# Patient Record
Sex: MALE | Race: WHITE | Employment: PART TIME | URBAN - METROPOLITAN AREA
[De-identification: names, ages, dates, MRNs, and addresses within clinical notes are randomized per-mention and may not be internally consistent; named-entity substitution may affect disease eponyms.]

---

## 2017-02-06 DIAGNOSIS — R97.20 ELEVATED PSA: ICD-10-CM

## 2017-02-13 ENCOUNTER — OFFICE VISIT (OUTPATIENT)
Dept: UROLOGY | Age: 57
End: 2017-02-13

## 2017-02-13 VITALS
WEIGHT: 190 LBS | HEIGHT: 68 IN | HEART RATE: 87 BPM | DIASTOLIC BLOOD PRESSURE: 84 MMHG | SYSTOLIC BLOOD PRESSURE: 126 MMHG | BODY MASS INDEX: 28.79 KG/M2

## 2017-02-13 DIAGNOSIS — Z87.898 HISTORY OF ELEVATED PSA: ICD-10-CM

## 2017-02-13 DIAGNOSIS — N40.0 BENIGN NON-NODULAR PROSTATIC HYPERPLASIA WITHOUT LOWER URINARY TRACT SYMPTOMS: Primary | ICD-10-CM

## 2017-02-13 PROCEDURE — 99213 OFFICE O/P EST LOW 20 MIN: CPT | Performed by: UROLOGY

## 2017-02-13 PROCEDURE — G8419 CALC BMI OUT NRM PARAM NOF/U: HCPCS | Performed by: UROLOGY

## 2017-02-13 PROCEDURE — 1036F TOBACCO NON-USER: CPT | Performed by: UROLOGY

## 2017-02-13 PROCEDURE — 3017F COLORECTAL CA SCREEN DOC REV: CPT | Performed by: UROLOGY

## 2017-02-13 PROCEDURE — G8427 DOCREV CUR MEDS BY ELIG CLIN: HCPCS | Performed by: UROLOGY

## 2017-02-13 PROCEDURE — G8484 FLU IMMUNIZE NO ADMIN: HCPCS | Performed by: UROLOGY

## 2017-02-13 ASSESSMENT — ENCOUNTER SYMPTOMS
ABDOMINAL DISTENTION: 0
ABDOMINAL PAIN: 0
SHORTNESS OF BREATH: 0

## 2017-08-07 DIAGNOSIS — Z87.898 HISTORY OF ELEVATED PSA: ICD-10-CM

## 2017-08-07 DIAGNOSIS — N40.0 BENIGN NON-NODULAR PROSTATIC HYPERPLASIA WITHOUT LOWER URINARY TRACT SYMPTOMS: ICD-10-CM

## 2017-08-07 LAB — PROSTATE SPECIFIC ANTIGEN: 3.57 NG/ML (ref 0–5.4)

## 2017-08-14 ENCOUNTER — OFFICE VISIT (OUTPATIENT)
Dept: UROLOGY | Age: 57
End: 2017-08-14

## 2017-08-14 VITALS
DIASTOLIC BLOOD PRESSURE: 90 MMHG | HEART RATE: 80 BPM | SYSTOLIC BLOOD PRESSURE: 160 MMHG | HEIGHT: 68 IN | WEIGHT: 185 LBS | BODY MASS INDEX: 28.04 KG/M2

## 2017-08-14 DIAGNOSIS — N40.0 BENIGN NON-NODULAR PROSTATIC HYPERPLASIA WITHOUT LOWER URINARY TRACT SYMPTOMS: Primary | ICD-10-CM

## 2017-08-14 DIAGNOSIS — Z87.898 HISTORY OF ELEVATED PSA: ICD-10-CM

## 2017-08-14 PROCEDURE — 1036F TOBACCO NON-USER: CPT | Performed by: UROLOGY

## 2017-08-14 PROCEDURE — G8419 CALC BMI OUT NRM PARAM NOF/U: HCPCS | Performed by: UROLOGY

## 2017-08-14 PROCEDURE — 99213 OFFICE O/P EST LOW 20 MIN: CPT | Performed by: UROLOGY

## 2017-08-14 PROCEDURE — 3017F COLORECTAL CA SCREEN DOC REV: CPT | Performed by: UROLOGY

## 2017-08-14 PROCEDURE — G8427 DOCREV CUR MEDS BY ELIG CLIN: HCPCS | Performed by: UROLOGY

## 2017-08-14 ASSESSMENT — ENCOUNTER SYMPTOMS: SHORTNESS OF BREATH: 0

## 2018-02-09 DIAGNOSIS — Z87.898 HISTORY OF ELEVATED PSA: ICD-10-CM

## 2018-02-09 DIAGNOSIS — N40.0 BENIGN NON-NODULAR PROSTATIC HYPERPLASIA WITHOUT LOWER URINARY TRACT SYMPTOMS: ICD-10-CM

## 2018-02-09 LAB — PROSTATE SPECIFIC ANTIGEN: 4.68 NG/ML (ref 0–5.4)

## 2018-02-15 ENCOUNTER — OFFICE VISIT (OUTPATIENT)
Dept: UROLOGY | Age: 58
End: 2018-02-15
Payer: COMMERCIAL

## 2018-02-15 VITALS
HEART RATE: 72 BPM | BODY MASS INDEX: 28.04 KG/M2 | SYSTOLIC BLOOD PRESSURE: 132 MMHG | HEIGHT: 68 IN | DIASTOLIC BLOOD PRESSURE: 76 MMHG | WEIGHT: 185 LBS

## 2018-02-15 DIAGNOSIS — R97.20 ELEVATED PSA: Primary | ICD-10-CM

## 2018-02-15 PROCEDURE — 1036F TOBACCO NON-USER: CPT | Performed by: UROLOGY

## 2018-02-15 PROCEDURE — G8419 CALC BMI OUT NRM PARAM NOF/U: HCPCS | Performed by: UROLOGY

## 2018-02-15 PROCEDURE — 99213 OFFICE O/P EST LOW 20 MIN: CPT | Performed by: UROLOGY

## 2018-02-15 PROCEDURE — 3017F COLORECTAL CA SCREEN DOC REV: CPT | Performed by: UROLOGY

## 2018-02-15 PROCEDURE — G8427 DOCREV CUR MEDS BY ELIG CLIN: HCPCS | Performed by: UROLOGY

## 2018-02-15 PROCEDURE — G8484 FLU IMMUNIZE NO ADMIN: HCPCS | Performed by: UROLOGY

## 2018-02-15 ASSESSMENT — ENCOUNTER SYMPTOMS
SHORTNESS OF BREATH: 0
ABDOMINAL PAIN: 0
ABDOMINAL DISTENTION: 0

## 2018-02-22 DIAGNOSIS — R97.20 ELEVATED PSA: ICD-10-CM

## 2018-02-22 LAB — PROSTATE SPECIFIC ANTIGEN: 3.6 NG/ML (ref 0–5.4)

## 2018-02-23 ENCOUNTER — OFFICE VISIT (OUTPATIENT)
Dept: UROLOGY | Age: 58
End: 2018-02-23
Payer: COMMERCIAL

## 2018-02-23 VITALS
DIASTOLIC BLOOD PRESSURE: 82 MMHG | HEART RATE: 69 BPM | WEIGHT: 185 LBS | SYSTOLIC BLOOD PRESSURE: 132 MMHG | BODY MASS INDEX: 28.04 KG/M2 | HEIGHT: 68 IN

## 2018-02-23 DIAGNOSIS — R97.20 ELEVATED PSA: Primary | ICD-10-CM

## 2018-02-23 LAB
BILIRUBIN, POC: NORMAL
BLOOD URINE, POC: NORMAL
CLARITY, POC: CLEAR
COLOR, POC: YELLOW
GLUCOSE URINE, POC: NORMAL
KETONES, POC: NORMAL
LEUKOCYTE EST, POC: NORMAL
NITRITE, POC: NORMAL
PH, POC: 5.5
PROTEIN, POC: NORMAL
SPECIFIC GRAVITY, POC: 1.02
UROBILINOGEN, POC: 0.2

## 2018-02-23 PROCEDURE — 3017F COLORECTAL CA SCREEN DOC REV: CPT | Performed by: UROLOGY

## 2018-02-23 PROCEDURE — 99212 OFFICE O/P EST SF 10 MIN: CPT | Performed by: UROLOGY

## 2018-02-23 PROCEDURE — G8427 DOCREV CUR MEDS BY ELIG CLIN: HCPCS | Performed by: UROLOGY

## 2018-02-23 PROCEDURE — 81003 URINALYSIS AUTO W/O SCOPE: CPT | Performed by: UROLOGY

## 2018-02-23 PROCEDURE — G8419 CALC BMI OUT NRM PARAM NOF/U: HCPCS | Performed by: UROLOGY

## 2018-02-23 PROCEDURE — 1036F TOBACCO NON-USER: CPT | Performed by: UROLOGY

## 2018-02-23 PROCEDURE — G8484 FLU IMMUNIZE NO ADMIN: HCPCS | Performed by: UROLOGY

## 2018-02-23 ASSESSMENT — ENCOUNTER SYMPTOMS: ABDOMINAL PAIN: 0

## 2018-06-20 DIAGNOSIS — R97.20 ELEVATED PSA: ICD-10-CM

## 2018-06-20 DIAGNOSIS — R97.20 ELEVATED PSA: Primary | ICD-10-CM

## 2018-06-20 LAB — PROSTATE SPECIFIC ANTIGEN: 4.15 NG/ML (ref 0–5.4)

## 2018-06-22 ENCOUNTER — OFFICE VISIT (OUTPATIENT)
Dept: UROLOGY | Age: 58
End: 2018-06-22
Payer: COMMERCIAL

## 2018-06-22 VITALS
BODY MASS INDEX: 28.04 KG/M2 | WEIGHT: 185 LBS | HEIGHT: 68 IN | HEART RATE: 73 BPM | SYSTOLIC BLOOD PRESSURE: 136 MMHG | DIASTOLIC BLOOD PRESSURE: 86 MMHG

## 2018-06-22 DIAGNOSIS — R97.20 ELEVATED PSA: Primary | ICD-10-CM

## 2018-06-22 DIAGNOSIS — N40.0 BPH WITHOUT OBSTRUCTION/LOWER URINARY TRACT SYMPTOMS: ICD-10-CM

## 2018-06-22 PROCEDURE — 3017F COLORECTAL CA SCREEN DOC REV: CPT | Performed by: UROLOGY

## 2018-06-22 PROCEDURE — G8419 CALC BMI OUT NRM PARAM NOF/U: HCPCS | Performed by: UROLOGY

## 2018-06-22 PROCEDURE — 1036F TOBACCO NON-USER: CPT | Performed by: UROLOGY

## 2018-06-22 PROCEDURE — 99214 OFFICE O/P EST MOD 30 MIN: CPT | Performed by: UROLOGY

## 2018-06-22 PROCEDURE — G8427 DOCREV CUR MEDS BY ELIG CLIN: HCPCS | Performed by: UROLOGY

## 2018-06-22 RX ORDER — CIPROFLOXACIN 500 MG/1
500 TABLET, FILM COATED ORAL 2 TIMES DAILY
Qty: 6 TABLET | Refills: 0 | Status: ON HOLD | OUTPATIENT
Start: 2018-06-22 | End: 2018-09-17 | Stop reason: HOSPADM

## 2018-06-22 ASSESSMENT — ENCOUNTER SYMPTOMS
SHORTNESS OF BREATH: 0
ABDOMINAL DISTENTION: 0
ABDOMINAL PAIN: 0

## 2018-08-10 ENCOUNTER — TELEPHONE (OUTPATIENT)
Dept: UROLOGY | Age: 58
End: 2018-08-10

## 2018-08-10 NOTE — TELEPHONE ENCOUNTER
Pt is unable to have truspbx on 8/29/18 at [facility]. Wednesdays are not good for him and wants to know if it could be done on 9/10 or 9/17 at [facility].

## 2018-08-13 NOTE — TELEPHONE ENCOUNTER
Either date is ok, just make sure he understands no ASA, NSAIDs, Vits, herbals 2 weeks prior and start Cipro Sunday before procedure

## 2018-09-10 ENCOUNTER — HOSPITAL ENCOUNTER (OUTPATIENT)
Dept: PREADMISSION TESTING | Age: 58
Discharge: HOME | End: 2018-09-14
Payer: COMMERCIAL

## 2018-09-10 VITALS
WEIGHT: 192.4 LBS | HEIGHT: 69 IN | TEMPERATURE: 97.6 F | OXYGEN SATURATION: 97 % | DIASTOLIC BLOOD PRESSURE: 96 MMHG | HEART RATE: 81 BPM | BODY MASS INDEX: 28.5 KG/M2 | RESPIRATION RATE: 16 BRPM | SYSTOLIC BLOOD PRESSURE: 167 MMHG

## 2018-09-10 DIAGNOSIS — R97.20 ELEVATED PSA: ICD-10-CM

## 2018-09-10 LAB
ANION GAP SERPL CALCULATED.3IONS-SCNC: 15 MEQ/L (ref 7–13)
BUN BLDV-MCNC: 10 MG/DL (ref 8–23)
CALCIUM SERPL-MCNC: 9.6 MG/DL (ref 8.6–10.2)
CHLORIDE BLD-SCNC: 102 MEQ/L (ref 98–107)
CO2: 26 MEQ/L (ref 22–29)
CREAT SERPL-MCNC: 0.91 MG/DL (ref 0.7–1.2)
EKG ATRIAL RATE: 75 BPM
EKG P AXIS: 56 DEGREES
EKG P-R INTERVAL: 160 MS
EKG Q-T INTERVAL: 392 MS
EKG QRS DURATION: 120 MS
EKG QTC CALCULATION (BAZETT): 437 MS
EKG R AXIS: -20 DEGREES
EKG T AXIS: 48 DEGREES
EKG VENTRICULAR RATE: 75 BPM
GFR AFRICAN AMERICAN: >60
GFR NON-AFRICAN AMERICAN: >60
GLUCOSE BLD-MCNC: 90 MG/DL (ref 74–109)
HCT VFR BLD CALC: 49 % (ref 42–52)
HEMOGLOBIN: 17.1 G/DL (ref 14–18)
MCH RBC QN AUTO: 31.6 PG (ref 27–31.3)
MCHC RBC AUTO-ENTMCNC: 34.8 % (ref 33–37)
MCV RBC AUTO: 90.9 FL (ref 80–100)
PDW BLD-RTO: 13.3 % (ref 11.5–14.5)
PLATELET # BLD: 240 K/UL (ref 130–400)
POTASSIUM SERPL-SCNC: 3.2 MEQ/L (ref 3.5–5.1)
RBC # BLD: 5.39 M/UL (ref 4.7–6.1)
SODIUM BLD-SCNC: 143 MEQ/L (ref 132–144)
WBC # BLD: 7.1 K/UL (ref 4.8–10.8)

## 2018-09-10 PROCEDURE — 93010 ELECTROCARDIOGRAM REPORT: CPT | Performed by: INTERNAL MEDICINE

## 2018-09-10 PROCEDURE — 85027 COMPLETE CBC AUTOMATED: CPT

## 2018-09-10 PROCEDURE — 80048 BASIC METABOLIC PNL TOTAL CA: CPT

## 2018-09-10 PROCEDURE — 93005 ELECTROCARDIOGRAM TRACING: CPT

## 2018-09-10 RX ORDER — SODIUM CHLORIDE, SODIUM LACTATE, POTASSIUM CHLORIDE, CALCIUM CHLORIDE 600; 310; 30; 20 MG/100ML; MG/100ML; MG/100ML; MG/100ML
INJECTION, SOLUTION INTRAVENOUS CONTINUOUS
Status: CANCELLED | OUTPATIENT
Start: 2018-09-10

## 2018-09-10 RX ORDER — SODIUM CHLORIDE 0.9 % (FLUSH) 0.9 %
10 SYRINGE (ML) INJECTION PRN
Status: CANCELLED | OUTPATIENT
Start: 2018-09-10

## 2018-09-10 RX ORDER — LIDOCAINE HYDROCHLORIDE 10 MG/ML
1 INJECTION, SOLUTION EPIDURAL; INFILTRATION; INTRACAUDAL; PERINEURAL
Status: CANCELLED | OUTPATIENT
Start: 2018-09-10 | End: 2018-09-10

## 2018-09-10 RX ORDER — SODIUM CHLORIDE 0.9 % (FLUSH) 0.9 %
10 SYRINGE (ML) INJECTION EVERY 12 HOURS SCHEDULED
Status: CANCELLED | OUTPATIENT
Start: 2018-09-10

## 2018-09-10 ASSESSMENT — ENCOUNTER SYMPTOMS
EYE PAIN: 0
SORE THROAT: 0
COUGH: 0
WHEEZING: 0
NAUSEA: 0
CONSTIPATION: 0
DOUBLE VISION: 0
HEARTBURN: 0
SINUS PAIN: 0
SHORTNESS OF BREATH: 0
EYE DISCHARGE: 0
ABDOMINAL PAIN: 0
DIARRHEA: 0
VOMITING: 0
BLURRED VISION: 0

## 2018-09-10 NOTE — H&P
normal and S2 normal.    No lower extremity edema noted   Pulmonary/Chest: Effort normal and breath sounds normal. He has no wheezes. Abdominal: Soft. Normal appearance and bowel sounds are normal. There is no hepatosplenomegaly. There is no tenderness. There is no CVA tenderness. Genitourinary:   Genitourinary Comments: Exam deferred   Musculoskeletal: Normal range of motion. Lymphadenopathy:     He has no cervical adenopathy. Neurological: He is alert and oriented to person, place, and time. Gait normal.   Skin: Skin is warm, dry and intact. Psychiatric: Affect normal.       Assessment:  Patient Active Problem List   Diagnosis    Elevated PSA         Plan:  Scheduled for transrectal ultrasound guided prostate biopsy.       JEB Nowak CNP  9/10/2018  9:50 AM

## 2018-09-13 ENCOUNTER — TELEPHONE (OUTPATIENT)
Dept: UROLOGY | Age: 58
End: 2018-09-13

## 2018-09-13 RX ORDER — CIPROFLOXACIN 500 MG/1
500 TABLET, FILM COATED ORAL 2 TIMES DAILY
Qty: 6 TABLET | Refills: 0 | Status: SHIPPED | OUTPATIENT
Start: 2018-09-13 | End: 2019-04-04 | Stop reason: ALTCHOICE

## 2018-09-16 ENCOUNTER — ANESTHESIA EVENT (OUTPATIENT)
Dept: OPERATING ROOM | Age: 58
End: 2018-09-16
Payer: COMMERCIAL

## 2018-09-17 ENCOUNTER — HOSPITAL ENCOUNTER (OUTPATIENT)
Dept: ULTRASOUND IMAGING | Age: 58
Discharge: HOME | End: 2018-09-19
Attending: UROLOGY
Payer: COMMERCIAL

## 2018-09-17 ENCOUNTER — ANESTHESIA (OUTPATIENT)
Dept: OPERATING ROOM | Age: 58
End: 2018-09-17
Payer: COMMERCIAL

## 2018-09-17 ENCOUNTER — HOSPITAL ENCOUNTER (OUTPATIENT)
Age: 58
Setting detail: OUTPATIENT SURGERY
Discharge: HOME | End: 2018-09-17
Attending: UROLOGY | Admitting: UROLOGY
Payer: COMMERCIAL

## 2018-09-17 VITALS
RESPIRATION RATE: 16 BRPM | OXYGEN SATURATION: 100 % | SYSTOLIC BLOOD PRESSURE: 137 MMHG | DIASTOLIC BLOOD PRESSURE: 84 MMHG | HEART RATE: 56 BPM | TEMPERATURE: 97.7 F

## 2018-09-17 VITALS
DIASTOLIC BLOOD PRESSURE: 56 MMHG | SYSTOLIC BLOOD PRESSURE: 103 MMHG | OXYGEN SATURATION: 97 % | RESPIRATION RATE: 17 BRPM

## 2018-09-17 PROCEDURE — 2500000003 HC RX 250 WO HCPCS: Performed by: UROLOGY

## 2018-09-17 PROCEDURE — 55700 PR BIOPSY OF PROSTATE,NEEDLE/PUNCH: CPT | Performed by: UROLOGY

## 2018-09-17 PROCEDURE — 3600000012 HC SURGERY LEVEL 2 ADDTL 15MIN: Performed by: UROLOGY

## 2018-09-17 PROCEDURE — 6370000000 HC RX 637 (ALT 250 FOR IP): Performed by: UROLOGY

## 2018-09-17 PROCEDURE — 6360000002 HC RX W HCPCS: Performed by: UROLOGY

## 2018-09-17 PROCEDURE — 7100000010 HC PHASE II RECOVERY - FIRST 15 MIN: Performed by: UROLOGY

## 2018-09-17 PROCEDURE — 2720000010 HC SURG SUPPLY STERILE: Performed by: UROLOGY

## 2018-09-17 PROCEDURE — 6360000002 HC RX W HCPCS: Performed by: NURSE ANESTHETIST, CERTIFIED REGISTERED

## 2018-09-17 PROCEDURE — 2709999900 HC NON-CHARGEABLE SUPPLY: Performed by: UROLOGY

## 2018-09-17 PROCEDURE — 3700000000 HC ANESTHESIA ATTENDED CARE: Performed by: UROLOGY

## 2018-09-17 PROCEDURE — 3700000001 HC ADD 15 MINUTES (ANESTHESIA): Performed by: UROLOGY

## 2018-09-17 PROCEDURE — 7100000011 HC PHASE II RECOVERY - ADDTL 15 MIN: Performed by: UROLOGY

## 2018-09-17 PROCEDURE — 2580000003 HC RX 258: Performed by: UROLOGY

## 2018-09-17 PROCEDURE — 76942 ECHO GUIDE FOR BIOPSY: CPT

## 2018-09-17 PROCEDURE — 88305 TISSUE EXAM BY PATHOLOGIST: CPT

## 2018-09-17 PROCEDURE — 7100000001 HC PACU RECOVERY - ADDTL 15 MIN: Performed by: UROLOGY

## 2018-09-17 PROCEDURE — 76872 US TRANSRECTAL: CPT | Performed by: UROLOGY

## 2018-09-17 PROCEDURE — 99999 PR OFFICE/OUTPT VISIT,PROCEDURE ONLY: CPT | Performed by: UROLOGY

## 2018-09-17 PROCEDURE — 2580000003 HC RX 258: Performed by: NURSE PRACTITIONER

## 2018-09-17 PROCEDURE — 3600000002 HC SURGERY LEVEL 2 BASE: Performed by: UROLOGY

## 2018-09-17 PROCEDURE — 7100000000 HC PACU RECOVERY - FIRST 15 MIN: Performed by: UROLOGY

## 2018-09-17 RX ORDER — MIDAZOLAM HYDROCHLORIDE 1 MG/ML
INJECTION INTRAMUSCULAR; INTRAVENOUS PRN
Status: DISCONTINUED | OUTPATIENT
Start: 2018-09-17 | End: 2018-09-17 | Stop reason: SDUPTHER

## 2018-09-17 RX ORDER — DIPHENHYDRAMINE HYDROCHLORIDE 50 MG/ML
12.5 INJECTION INTRAMUSCULAR; INTRAVENOUS
Status: DISCONTINUED | OUTPATIENT
Start: 2018-09-17 | End: 2018-09-17 | Stop reason: HOSPADM

## 2018-09-17 RX ORDER — SODIUM CHLORIDE 0.9 % (FLUSH) 0.9 %
10 SYRINGE (ML) INJECTION PRN
Status: DISCONTINUED | OUTPATIENT
Start: 2018-09-17 | End: 2018-09-17 | Stop reason: HOSPADM

## 2018-09-17 RX ORDER — ACETAMINOPHEN 325 MG/1
650 TABLET ORAL EVERY 4 HOURS PRN
Status: DISCONTINUED | OUTPATIENT
Start: 2018-09-17 | End: 2018-09-17 | Stop reason: HOSPADM

## 2018-09-17 RX ORDER — PROPOFOL 10 MG/ML
INJECTION, EMULSION INTRAVENOUS CONTINUOUS PRN
Status: DISCONTINUED | OUTPATIENT
Start: 2018-09-17 | End: 2018-09-17 | Stop reason: SDUPTHER

## 2018-09-17 RX ORDER — LIDOCAINE HYDROCHLORIDE 10 MG/ML
1 INJECTION, SOLUTION EPIDURAL; INFILTRATION; INTRACAUDAL; PERINEURAL
Status: DISCONTINUED | OUTPATIENT
Start: 2018-09-17 | End: 2018-09-17 | Stop reason: HOSPADM

## 2018-09-17 RX ORDER — LIDOCAINE HYDROCHLORIDE 20 MG/ML
INJECTION, SOLUTION EPIDURAL; INFILTRATION; INTRACAUDAL; PERINEURAL PRN
Status: DISCONTINUED | OUTPATIENT
Start: 2018-09-17 | End: 2018-09-17 | Stop reason: HOSPADM

## 2018-09-17 RX ORDER — FENTANYL CITRATE 50 UG/ML
50 INJECTION, SOLUTION INTRAMUSCULAR; INTRAVENOUS EVERY 10 MIN PRN
Status: DISCONTINUED | OUTPATIENT
Start: 2018-09-17 | End: 2018-09-17 | Stop reason: HOSPADM

## 2018-09-17 RX ORDER — CIPROFLOXACIN 2 MG/ML
400 INJECTION, SOLUTION INTRAVENOUS ONCE
Status: COMPLETED | OUTPATIENT
Start: 2018-09-17 | End: 2018-09-17

## 2018-09-17 RX ORDER — SODIUM CHLORIDE, SODIUM LACTATE, POTASSIUM CHLORIDE, CALCIUM CHLORIDE 600; 310; 30; 20 MG/100ML; MG/100ML; MG/100ML; MG/100ML
INJECTION, SOLUTION INTRAVENOUS CONTINUOUS
Status: DISCONTINUED | OUTPATIENT
Start: 2018-09-17 | End: 2018-09-17 | Stop reason: HOSPADM

## 2018-09-17 RX ORDER — SODIUM CHLORIDE, SODIUM LACTATE, POTASSIUM CHLORIDE, CALCIUM CHLORIDE 600; 310; 30; 20 MG/100ML; MG/100ML; MG/100ML; MG/100ML
INJECTION, SOLUTION INTRAVENOUS
Status: DISCONTINUED
Start: 2018-09-17 | End: 2018-09-17 | Stop reason: HOSPADM

## 2018-09-17 RX ORDER — HYDROCODONE BITARTRATE AND ACETAMINOPHEN 5; 325 MG/1; MG/1
1 TABLET ORAL PRN
Status: DISCONTINUED | OUTPATIENT
Start: 2018-09-17 | End: 2018-09-17 | Stop reason: HOSPADM

## 2018-09-17 RX ORDER — METOCLOPRAMIDE HYDROCHLORIDE 5 MG/ML
10 INJECTION INTRAMUSCULAR; INTRAVENOUS
Status: DISCONTINUED | OUTPATIENT
Start: 2018-09-17 | End: 2018-09-17 | Stop reason: HOSPADM

## 2018-09-17 RX ORDER — SODIUM CHLORIDE 0.9 % (FLUSH) 0.9 %
10 SYRINGE (ML) INJECTION EVERY 12 HOURS SCHEDULED
Status: DISCONTINUED | OUTPATIENT
Start: 2018-09-17 | End: 2018-09-17 | Stop reason: HOSPADM

## 2018-09-17 RX ORDER — ONDANSETRON 2 MG/ML
4 INJECTION INTRAMUSCULAR; INTRAVENOUS
Status: DISCONTINUED | OUTPATIENT
Start: 2018-09-17 | End: 2018-09-17 | Stop reason: HOSPADM

## 2018-09-17 RX ORDER — MAGNESIUM HYDROXIDE 1200 MG/15ML
LIQUID ORAL PRN
Status: DISCONTINUED | OUTPATIENT
Start: 2018-09-17 | End: 2018-09-17 | Stop reason: HOSPADM

## 2018-09-17 RX ORDER — MEPERIDINE HYDROCHLORIDE 25 MG/ML
12.5 INJECTION INTRAMUSCULAR; INTRAVENOUS; SUBCUTANEOUS EVERY 5 MIN PRN
Status: DISCONTINUED | OUTPATIENT
Start: 2018-09-17 | End: 2018-09-17 | Stop reason: HOSPADM

## 2018-09-17 RX ORDER — HYDROCODONE BITARTRATE AND ACETAMINOPHEN 5; 325 MG/1; MG/1
2 TABLET ORAL PRN
Status: DISCONTINUED | OUTPATIENT
Start: 2018-09-17 | End: 2018-09-17 | Stop reason: HOSPADM

## 2018-09-17 RX ORDER — ONDANSETRON 2 MG/ML
4 INJECTION INTRAMUSCULAR; INTRAVENOUS EVERY 6 HOURS PRN
Status: DISCONTINUED | OUTPATIENT
Start: 2018-09-17 | End: 2018-09-17 | Stop reason: HOSPADM

## 2018-09-17 RX ADMIN — MIDAZOLAM HYDROCHLORIDE 2 MG: 1 INJECTION, SOLUTION INTRAMUSCULAR; INTRAVENOUS at 07:30

## 2018-09-17 RX ADMIN — SODIUM CHLORIDE, POTASSIUM CHLORIDE, SODIUM LACTATE AND CALCIUM CHLORIDE 125 ML/HR: 600; 310; 30; 20 INJECTION, SOLUTION INTRAVENOUS at 06:31

## 2018-09-17 RX ADMIN — PROPOFOL 100 MCG/KG/MIN: 10 INJECTION, EMULSION INTRAVENOUS at 07:39

## 2018-09-17 RX ADMIN — CIPROFLOXACIN 400 MG: 2 INJECTION, SOLUTION INTRAVENOUS at 07:34

## 2018-09-17 ASSESSMENT — PULMONARY FUNCTION TESTS
PIF_VALUE: 0
PIF_VALUE: 1
PIF_VALUE: 0
PIF_VALUE: 1
PIF_VALUE: 1
PIF_VALUE: 0
PIF_VALUE: 1
PIF_VALUE: 1

## 2018-09-17 ASSESSMENT — PAIN - FUNCTIONAL ASSESSMENT: PAIN_FUNCTIONAL_ASSESSMENT: 0-10

## 2018-09-17 NOTE — PROGRESS NOTES
Disch instr have been reviewed with patient and wife prior to procedure by Dr. Hitesh Curry; were re-inforced at this time. Strong urge to void. Assisted up to BR with brisk, steady gait. Wife supportive. Victoriano all procedures very well. Anxious to go home.

## 2018-09-17 NOTE — ANESTHESIA PRE PROCEDURE
Department of Anesthesiology  Preprocedure Note       Name:  Arturo Haney   Age:  59 y.o.  :  1960                                          MRN:  [id number]         Date:  2018      Surgeon: Fish Escalante):  Geeta Lee MD    Procedure: Procedure(s):  TRANSRECTAL ULTRASOUND GUIDED PROSTATE BIOPSY    Medications prior to admission:   Prior to Admission medications    Medication Sig Start Date End Date Taking? Authorizing Provider   ciprofloxacin (CIPRO) 500 MG tablet Take 1 tablet by mouth 2 times daily Start day prior to planned procedure 18  Yes Geeta Lee MD   amLODIPine (NORVASC) 5 MG tablet  9/19/15  Yes Historical Provider, MD   KLOR-CON 10 10 MEQ tablet  2/15/16  Yes Historical Provider, MD   ciprofloxacin (CIPRO) 500 MG tablet Take 1 tablet by mouth 2 times daily Start day before surgery.  Take day of and day after 18   Geeta Lee MD   aspirin 81 MG tablet Take 81 mg by mouth daily    Historical Provider, MD   vitamin D (CHOLECALCIFEROL) 1000 UNIT TABS tablet Take 1,000 Units by mouth 2 times daily    Historical Provider, MD   hydrochlorothiazide (HYDRODIURIL) 25 MG tablet  9/19/15   Historical Provider, MD       Current medications:    Current Facility-Administered Medications   Medication Dose Route Frequency Provider Last Rate Last Dose    lactated ringers infusion   Intravenous Continuous JEB Rodriguez -  mL/hr at 18 0631 125 mL/hr at 18 0631    lidocaine PF 1 % injection 1 mL  1 mL Intradermal Once PRN Isabel Granados-Jo APRN - CNP        sodium chloride flush 0.9 % injection 10 mL  10 mL Intravenous 2 times per day Cristopher APRN - CNP        sodium chloride flush 0.9 % injection 10 mL  10 mL Intravenous PRN Isabel Kelseype-Jo APRN - CNP        lactated ringers infusion                Allergies:  No Known Allergies    Problem List:    Patient Active Problem List   Diagnosis Code    Elevated PSA R97.20 Past Medical History:        Diagnosis Date    Hypertension     on meds x 5 yrs       Past Surgical History:        Procedure Laterality Date    COLONOSCOPY  4/20/16        LITHOTRIPSY  1990    VASECTOMY  12/1992       Social History:    Social History   Substance Use Topics    Smoking status: Never Smoker    Smokeless tobacco: Never Used    Alcohol use Yes      Comment: Socially                                Counseling given: Not Answered      Vital Signs (Current):   Vitals:    09/17/18 0604 09/17/18 0606   BP: (!) 164/89    Pulse: 71    Resp:  12   Temp: 97 °F (36.1 °C)    TempSrc: Temporal    SpO2: 98%                                               BP Readings from Last 3 Encounters:   09/17/18 (!) 164/89   09/10/18 (!) 167/96   06/22/18 136/86       NPO Status: Time of last liquid consumption: 2300                        Time of last solid consumption: 2000                        Date of last liquid consumption: 09/16/18                        Date of last solid food consumption: 09/16/18    BMI:   Wt Readings from Last 3 Encounters:   09/10/18 192 lb 6.4 oz (87.3 kg)   06/22/18 185 lb (83.9 kg)   02/23/18 185 lb (83.9 kg)     There is no height or weight on file to calculate BMI.    CBC:   Lab Results   Component Value Date    WBC 7.1 09/10/2018    RBC 5.39 09/10/2018    RBC 4.93 09/10/2011    HGB 17.1 09/10/2018    HCT 49.0 09/10/2018    MCV 90.9 09/10/2018    RDW 13.3 09/10/2018     09/10/2018       CMP:   Lab Results   Component Value Date     09/10/2018    K 3.2 09/10/2018     09/10/2018    CO2 26 09/10/2018    BUN 10 09/10/2018    CREATININE 0.91 09/10/2018    GFRAA >60.0 09/10/2018    LABGLOM >60.0 09/10/2018    GLUCOSE 90 09/10/2018    GLUCOSE 87 09/10/2011    PROT 7.1 05/04/2018    CALCIUM 9.6 09/10/2018    BILITOT 1.7 05/04/2018    ALKPHOS 88 05/04/2018    AST 26 05/04/2018    ALT 36 05/04/2018       POC Tests: No results for input(s): POCGLU, POCNA, POCK,

## 2018-09-17 NOTE — BRIEF OP NOTE
Brief Postoperative Note  ______________________________________________________________    Patient: Rivera Palacio  YOB: 1960  MRN: [id number]  Date of Procedure: 9/17/2018    Pre-Op Diagnosis: ELEVATED PSA of 4.15 ng/ml    Post-Op Diagnosis: Same       Procedure(s):  TRANSRECTAL ULTRASOUND GUIDED PROSTATE BIOPSIES    Anesthesia: Monitor Anesthesia Care    Surgeon(s):  Alonso Blue MD    Staff:  Scrub Person First: Shelly Banks     Estimated Blood Loss: Minimal    Complications: None    Specimens:   ID Type Source Tests Collected by Time Destination   A : A. RT BASE PROSTATE Tissue Prostate SURGICAL PATHOLOGY Alonso Blue MD 9/17/2018 0737    B : [address] [facility] Tissue Prostate SURGICAL PATHOLOGY Alonso Blue MD 9/17/2018 9940    C : Aung [place] Tissue Prostate SURGICAL PATHOLOGY Alonso Blue MD 9/17/2018 1720    D : G. LT BASE PROSTATE Tissue Prostate SURGICAL PATHOLOGY Alonso Blue MD 9/17/2018 1059    E : I. LT MEDIAL PROSTATE Tissue Prostate SURGICAL PATHOLOGY Alonso Blue MD 9/17/2018 8126    F : K. LT APEX PROSTATE Tissue Prostate SURGICAL PATHOLOGY Alonso Blue MD 9/17/2018 1383        Implants:  None      Drains:  None    Findings: PV was 34.8 cc and 12 biopsies obtained    Alonso Blue MD  Date: 9/17/2018  Time: 7:59 AM

## 2018-09-17 NOTE — ANESTHESIA POSTPROCEDURE EVALUATION
Department of Anesthesiology  Postprocedure Note    Patient: John Fairchild  MRN: [id number]  YOB: 1960  Date of evaluation: 9/17/2018  Time:  8:07 AM     Procedure Summary     Date:  09/17/18 Room / Location:  [facility]    Anesthesia Start:  3595 Anesthesia Stop:      Procedure:  TRANSRECTAL ULTRASOUND GUIDED PROSTATE BIOPSY (N/A ) Diagnosis:  (ELEVATED PSA)    Surgeon:  Bennett Mcdowell MD Responsible Provider:  Marleen Yarbrough DO    Anesthesia Type:  MAC ASA Status:  1          Anesthesia Type: MAC    Rowan Phase I: Rowan Score: 10    Rowan Phase II:      Last vitals: Reviewed and per EMR flowsheets.        Anesthesia Post Evaluation    Patient location during evaluation: PACU  Patient participation: complete - patient participated  Level of consciousness: awake and alert  Pain score: 0  Airway patency: patent  Nausea & Vomiting: no nausea and no vomiting  Complications: no  Cardiovascular status: blood pressure returned to baseline and hemodynamically stable  Respiratory status: acceptable  Hydration status: euvolemic

## 2018-10-01 ENCOUNTER — OFFICE VISIT (OUTPATIENT)
Dept: UROLOGY | Age: 58
End: 2018-10-01
Payer: COMMERCIAL

## 2018-10-01 VITALS
WEIGHT: 190 LBS | HEIGHT: 68 IN | DIASTOLIC BLOOD PRESSURE: 80 MMHG | HEART RATE: 72 BPM | SYSTOLIC BLOOD PRESSURE: 130 MMHG | BODY MASS INDEX: 28.79 KG/M2

## 2018-10-01 DIAGNOSIS — R97.20 ELEVATED PSA: Primary | ICD-10-CM

## 2018-10-01 PROCEDURE — G8419 CALC BMI OUT NRM PARAM NOF/U: HCPCS | Performed by: UROLOGY

## 2018-10-01 PROCEDURE — 1036F TOBACCO NON-USER: CPT | Performed by: UROLOGY

## 2018-10-01 PROCEDURE — G8484 FLU IMMUNIZE NO ADMIN: HCPCS | Performed by: UROLOGY

## 2018-10-01 PROCEDURE — G8427 DOCREV CUR MEDS BY ELIG CLIN: HCPCS | Performed by: UROLOGY

## 2018-10-01 PROCEDURE — 99213 OFFICE O/P EST LOW 20 MIN: CPT | Performed by: UROLOGY

## 2018-10-01 PROCEDURE — 3017F COLORECTAL CA SCREEN DOC REV: CPT | Performed by: UROLOGY

## 2018-10-01 ASSESSMENT — ENCOUNTER SYMPTOMS
ABDOMINAL PAIN: 0
ABDOMINAL DISTENTION: 0

## 2018-10-01 NOTE — PROGRESS NOTES
Subjective:      Patient ID: Chloe Lantigua is a 59 y.o. male. HPI This is a 61 yo male with h/o HTN, back after Trus/Prostate biopsy at [facility] for an elevated PSA on 9/17/18. Since the procedure, he had some mild hematuria that has almost resolved. He has no other complaints and is here with his wife today. I reviewed the interval pathology today. PV: 34.8 cc    Past Medical History:   Diagnosis Date    Hypertension     on meds x 5 yrs     Past Surgical History:   Procedure Laterality Date    COLONOSCOPY  4/20/16        LITHOTRIPSY  1990    [place] BIOPSY OF PROSTATE,NEEDLE/PUNCH N/A 9/17/2018    TRANSRECTAL ULTRASOUND GUIDED PROSTATE BIOPSY performed by Neri Rice MD at [place] [address]  12/1992     Social History     Social History    Marital status:      Spouse name: N/A    Number of children: N/A    Years of education: N/A     Social History Main Topics    Smoking status: Never Smoker    Smokeless tobacco: Never Used    Alcohol use Yes      Comment: Socially    Drug use: No    Sexual activity: Not Asked     Other Topics Concern    None     Social History Narrative    None     Family History   Problem Relation Age of Onset    No Known Problems Mother     Other Father 80        dementia    High Blood Pressure Father     Other Brother 58        AAA    No Known Problems Brother     No Known Problems Son     No Known Problems Daughter      Current Outpatient Prescriptions   Medication Sig Dispense Refill    hydrochlorothiazide (HYDRODIURIL) 25 MG tablet       amLODIPine (NORVASC) 5 MG tablet       KLOR-CON 10 10 MEQ tablet   3    ciprofloxacin (CIPRO) 500 MG tablet Take 1 tablet by mouth 2 times daily Start day before surgery. Take day of and day after 6 tablet 0     No current facility-administered medications for this visit. Patient has no known allergies. reviewed      Review of Systems   Constitutional: Negative for fever.    Gastrointestinal: container is labeled with the patient's name and  designated \"right medial prostate\".  In Prefer are three needle biopsy  fragments ranging from 0.5 to 1.3 cm in length.  Submitted in toto, one  cassette. Angela Bai specimen container is labeled with the patient's name and  designated \"right apex of prostate\".  In Prefer are three needle biopsy  fragments a ranging in length from 0.5 to 1.3 cm in length.  Submitted in  toto, one cassette. D.  The specimen container is labeled with the patient's name and  designated \"left base of prostate\".  In Prefer are two needle biopsy  fragments measuring up to 1.7 cm in length.  Submitted in toto, one  cassette. E.  The specimen container is labeled with the patient's name and  designated \"left medial prostate\".  In Prefer are two needle biopsy  fragments measuring up to 1.5 cm in length.  Submitted in toto, one  cassette. Shani Ash specimen container is labeled with the patient's name and  designated \"left apex of prostate\".  In Prefer are three needle biopsy  fragments ranging in length from 0.4 to 1.3 cm in length.  Submitted in  toto, one cassette.     SONIA/CHARLIE            CPT: 38607 X6      Intradepartmental Consultation performed by:  Dr. Gosia Pimentel M.D., who concurs with the above diagnosis. Kar Delgado M.D.  09/18/2018   Electronically signed out by                                                                     Page 1 of 1   Lab and Collection       PSA   Date Value Ref Range Status   06/20/2018 4.15 0.00 - 5.40 ng/mL Final   02/22/2018 3.60 0.00 - 5.40 ng/mL Final   02/09/2018 4.68 0.00 - 5.40 ng/mL Final   08/07/2017 3.57 0.00 - 5.40 ng/mL Final   02/06/2017 3.36 0.00 - 5.40 ng/mL Final       Assessment: This is a 63 yo male with h/o HTN, and with h/o elevated PSA and recent negative prostate biopsy. I recommend continued closer PSA follow-up and he agrees.  Also, discussed possible dietary cancer prevention options including but not

## 2019-04-01 DIAGNOSIS — R97.20 ELEVATED PSA: ICD-10-CM

## 2019-04-01 LAB — PROSTATE SPECIFIC ANTIGEN: 8.87 NG/ML (ref 0–5.4)

## 2019-04-04 ENCOUNTER — OFFICE VISIT (OUTPATIENT)
Dept: UROLOGY | Age: 59
End: 2019-04-04
Payer: COMMERCIAL

## 2019-04-04 VITALS
BODY MASS INDEX: 28.04 KG/M2 | HEART RATE: 75 BPM | HEIGHT: 68 IN | SYSTOLIC BLOOD PRESSURE: 138 MMHG | DIASTOLIC BLOOD PRESSURE: 88 MMHG | WEIGHT: 185 LBS

## 2019-04-04 DIAGNOSIS — R97.20 ELEVATED PSA: Primary | ICD-10-CM

## 2019-04-04 PROCEDURE — 3017F COLORECTAL CA SCREEN DOC REV: CPT | Performed by: UROLOGY

## 2019-04-04 PROCEDURE — 1036F TOBACCO NON-USER: CPT | Performed by: UROLOGY

## 2019-04-04 PROCEDURE — G8419 CALC BMI OUT NRM PARAM NOF/U: HCPCS | Performed by: UROLOGY

## 2019-04-04 PROCEDURE — 99213 OFFICE O/P EST LOW 20 MIN: CPT | Performed by: UROLOGY

## 2019-04-04 PROCEDURE — G8427 DOCREV CUR MEDS BY ELIG CLIN: HCPCS | Performed by: UROLOGY

## 2019-04-04 ASSESSMENT — ENCOUNTER SYMPTOMS
SHORTNESS OF BREATH: 0
ABDOMINAL DISTENTION: 0
ABDOMINAL PAIN: 0

## 2019-04-04 NOTE — PROGRESS NOTES
Subjective:      Patient ID: Glenice Severs is a 62 y.o. male. HPI  This is a 56 yo male with h/o HTN, and elevated PSA's s/p negative prostate biopsy on 9/17/18, back in follow-up. Since last seen on 10/1/18, he has no hematuria, dysuria or pain. He has no frequency or urgency or pain. He has no new medical or surgical problems.  I reviewed the interval; PSA today.      PV: 34.8 cc    Past Medical History:   Diagnosis Date    Hypertension     on meds x 5 yrs     Past Surgical History:   Procedure Laterality Date    COLONOSCOPY  4/20/16        LITHOTRIPSY  1990    [place] BIOPSY OF PROSTATE,NEEDLE/PUNCH N/A 9/17/2018    TRANSRECTAL ULTRASOUND GUIDED PROSTATE BIOPSY performed by Christine Turner MD at [address] [facility]  12/1992     Social History     Socioeconomic History    Marital status:      Spouse name: None    Number of children: None    Years of education: None    Highest education level: None   Occupational History    None   Social Needs    Financial resource strain: None    Food insecurity:     Worry: None     Inability: None    Transportation needs:     Medical: None     Non-medical: None   Tobacco Use    Smoking status: Never Smoker    Smokeless tobacco: Never Used   Substance and Sexual Activity    Alcohol use: Yes     Comment: Socially    Drug use: No    Sexual activity: None   Lifestyle    Physical activity:     Days per week: None     Minutes per session: None    Stress: None   Relationships    Social connections:     Talks on phone: None     Gets together: None     Attends Buddhism service: None     Active member of club or organization: None     Attends meetings of clubs or organizations: None     Relationship status: None    Intimate partner violence:     Fear of current or ex partner: None     Emotionally abused: None     Physically abused: None     Forced sexual activity: None   Other Topics Concern    None   Social History Narrative    None Family History   Problem Relation Age of Onset    No Known Problems Mother     Other Father 80        dementia    High Blood Pressure Father     Other Brother 58        AAA    No Known Problems Brother     No Known Problems Son     No Known Problems Daughter      Current Outpatient Medications   Medication Sig Dispense Refill    hydrochlorothiazide (HYDRODIURIL) 25 MG tablet       amLODIPine (NORVASC) 5 MG tablet       KLOR-CON 10 10 MEQ tablet   3     No current facility-administered medications for this visit. Patient has no known allergies. reviewed    Review of Systems   Constitutional: Negative for fever and unexpected weight change. Respiratory: Negative for shortness of breath. Cardiovascular: Negative for chest pain. Gastrointestinal: Negative for abdominal distention and abdominal pain. Genitourinary: Negative for dysuria, enuresis, flank pain and hematuria. Objective:   Physical Exam   Constitutional: He appears well-developed and well-nourished. Genitourinary: Rectal exam shows external hemorrhoid. Prostate is enlarged. Prostate is not tender. Genitourinary Comments: Prostate is 2 + and without nodules   Psychiatric: He has a normal mood and affect. PSA   Date Value Ref Range Status   04/01/2019 8.87 (H) 0.00 - 5.40 ng/mL Final   06/20/2018 4.15 0.00 - 5.40 ng/mL Final   02/22/2018 3.60 0.00 - 5.40 ng/mL Final   02/09/2018 4.68 0.00 - 5.40 ng/mL Final   08/07/2017 3.57 0.00 - 5.40 ng/mL Final       Assessment: This is a 56 yo male with h/o HTN, and elevated PSA's (neg biopsy in 9/18) and recent rise in the PSA. I recommend continued but closer PSA observation and he agrees. The option of another biopsy was also discussed. Plan:      1.  F/U 4 mo for PSA        Nikia Muñoz MD

## 2019-09-13 DIAGNOSIS — R97.20 ELEVATED PSA: ICD-10-CM

## 2019-09-13 LAB — PROSTATE SPECIFIC ANTIGEN: 4.96 NG/ML (ref 0–5.4)

## 2019-09-16 ENCOUNTER — OFFICE VISIT (OUTPATIENT)
Dept: UROLOGY | Age: 59
End: 2019-09-16
Payer: COMMERCIAL

## 2019-09-16 VITALS
DIASTOLIC BLOOD PRESSURE: 90 MMHG | HEART RATE: 82 BPM | SYSTOLIC BLOOD PRESSURE: 150 MMHG | HEIGHT: 68 IN | WEIGHT: 190 LBS | BODY MASS INDEX: 28.79 KG/M2

## 2019-09-16 DIAGNOSIS — R97.20 ELEVATED PSA: Primary | ICD-10-CM

## 2019-09-16 PROCEDURE — 1036F TOBACCO NON-USER: CPT | Performed by: UROLOGY

## 2019-09-16 PROCEDURE — 3017F COLORECTAL CA SCREEN DOC REV: CPT | Performed by: UROLOGY

## 2019-09-16 PROCEDURE — G8419 CALC BMI OUT NRM PARAM NOF/U: HCPCS | Performed by: UROLOGY

## 2019-09-16 PROCEDURE — 99213 OFFICE O/P EST LOW 20 MIN: CPT | Performed by: UROLOGY

## 2019-09-16 PROCEDURE — G8427 DOCREV CUR MEDS BY ELIG CLIN: HCPCS | Performed by: UROLOGY

## 2019-09-16 ASSESSMENT — ENCOUNTER SYMPTOMS
ABDOMINAL DISTENTION: 0
ABDOMINAL PAIN: 0

## 2020-08-20 LAB — PROSTATE SPECIFIC ANTIGEN: 5.72 NG/ML (ref 0–5.4)

## 2020-09-01 ENCOUNTER — OFFICE VISIT (OUTPATIENT)
Dept: UROLOGY | Age: 60
End: 2020-09-01
Payer: COMMERCIAL

## 2020-09-01 VITALS
BODY MASS INDEX: 28.79 KG/M2 | DIASTOLIC BLOOD PRESSURE: 86 MMHG | WEIGHT: 190 LBS | SYSTOLIC BLOOD PRESSURE: 128 MMHG | HEART RATE: 73 BPM | HEIGHT: 68 IN

## 2020-09-01 PROCEDURE — G8419 CALC BMI OUT NRM PARAM NOF/U: HCPCS | Performed by: UROLOGY

## 2020-09-01 PROCEDURE — 99213 OFFICE O/P EST LOW 20 MIN: CPT | Performed by: UROLOGY

## 2020-09-01 PROCEDURE — G8427 DOCREV CUR MEDS BY ELIG CLIN: HCPCS | Performed by: UROLOGY

## 2020-09-01 PROCEDURE — 3017F COLORECTAL CA SCREEN DOC REV: CPT | Performed by: UROLOGY

## 2020-09-01 PROCEDURE — 1036F TOBACCO NON-USER: CPT | Performed by: UROLOGY

## 2020-09-01 RX ORDER — LEVOTHYROXINE SODIUM 25 MCG
TABLET ORAL
COMMUNITY
Start: 2020-07-29 | End: 2021-10-08

## 2020-09-01 ASSESSMENT — ENCOUNTER SYMPTOMS
ABDOMINAL PAIN: 0
ABDOMINAL DISTENTION: 0
SHORTNESS OF BREATH: 0

## 2020-09-01 NOTE — PROGRESS NOTES
Subjective:      Patient ID: Jami Mari is a 56 y.o. male. HPI  This is a 60 yo male with h/o HTN, and elevated PSA's s/p negative prostate biopsy on 9/17/18, back in follow-up. Since last seen on 9/16/19, he has no hematuria, dysuria or pain. He has no frequency or urgency or pain. He has no wt loss or abnl bone pains. He has no new medical or surgical problems.  I reviewed the interval PSA today.      Prostate biopsy 9/18: neg, PV: 34.8 cc      Past Medical History:   Diagnosis Date    Hypertension     on meds x 5 yrs     Past Surgical History:   Procedure Laterality Date    COLONOSCOPY  4/20/16        LITHOTRIPSY  1990    [place] BIOPSY OF PROSTATE,NEEDLE/PUNCH N/A 9/17/2018    TRANSRECTAL ULTRASOUND GUIDED PROSTATE BIOPSY performed by Hernandez Mo MD at [place] [address]  12/1992     Social History     Socioeconomic History    Marital status:      Spouse name: None    Number of children: None    Years of education: None    Highest education level: None   Occupational History    None   Social Needs    Financial resource strain: None    Food insecurity     Worry: None     Inability: None    Transportation needs     Medical: None     Non-medical: None   Tobacco Use    Smoking status: Never Smoker    Smokeless tobacco: Never Used   Substance and Sexual Activity    Alcohol use: Yes     Comment: Socially    Drug use: No    Sexual activity: None   Lifestyle    Physical activity     Days per week: None     Minutes per session: None    Stress: None   Relationships    Social connections     Talks on phone: None     Gets together: None     Attends Congregational service: None     Active member of club or organization: None     Attends meetings of clubs or organizations: None     Relationship status: None    Intimate partner violence     Fear of current or ex partner: None     Emotionally abused: None     Physically abused: None     Forced sexual activity: None   Other Topics

## 2021-03-04 LAB — PROSTATE SPECIFIC ANTIGEN: 4.96 NG/ML (ref 0–5.4)

## 2021-03-11 ENCOUNTER — OFFICE VISIT (OUTPATIENT)
Dept: UROLOGY | Age: 61
End: 2021-03-11
Payer: COMMERCIAL

## 2021-03-11 VITALS
DIASTOLIC BLOOD PRESSURE: 84 MMHG | SYSTOLIC BLOOD PRESSURE: 132 MMHG | HEART RATE: 74 BPM | HEIGHT: 68 IN | WEIGHT: 172 LBS | BODY MASS INDEX: 26.07 KG/M2

## 2021-03-11 DIAGNOSIS — R97.20 ELEVATED PSA: Primary | ICD-10-CM

## 2021-03-11 PROCEDURE — 3017F COLORECTAL CA SCREEN DOC REV: CPT | Performed by: UROLOGY

## 2021-03-11 PROCEDURE — 1036F TOBACCO NON-USER: CPT | Performed by: UROLOGY

## 2021-03-11 PROCEDURE — G8484 FLU IMMUNIZE NO ADMIN: HCPCS | Performed by: UROLOGY

## 2021-03-11 PROCEDURE — 99213 OFFICE O/P EST LOW 20 MIN: CPT | Performed by: UROLOGY

## 2021-03-11 PROCEDURE — G8427 DOCREV CUR MEDS BY ELIG CLIN: HCPCS | Performed by: UROLOGY

## 2021-03-11 PROCEDURE — G8419 CALC BMI OUT NRM PARAM NOF/U: HCPCS | Performed by: UROLOGY

## 2021-03-11 ASSESSMENT — ENCOUNTER SYMPTOMS
SHORTNESS OF BREATH: 0
ABDOMINAL DISTENTION: 0
ABDOMINAL PAIN: 0

## 2021-03-11 NOTE — PROGRESS NOTES
Subjective:      Patient ID: Shaunna Bernstein is a 59 y.o. male. HPI  This is a 57 yo male with h/o HTN, and elevated PSA's s/p negative prostate biopsy on 9/17/18, back in follow-up. Since last seen on 9/1/20, he has no hematuria, dysuria or pain. He has no frequency or urgency or pain. He has no wt loss or abnl bone pains. He voids with a good flow. He has no new medical or surgical problems.  I reviewed the interval PSA today.      Prostate biopsy 9/18: neg, PV: 34.8 cc      Past Medical History:   Diagnosis Date    Hypertension     on meds x 5 yrs     Past Surgical History:   Procedure Laterality Date    COLONOSCOPY  4/20/16        LITHOTRIPSY  1990    [place] BIOPSY OF PROSTATE,NEEDLE/PUNCH N/A 9/17/2018    TRANSRECTAL ULTRASOUND GUIDED PROSTATE BIOPSY performed by Isabel Holder MD at [place] [address]  12/1992     Social History     Socioeconomic History    Marital status:      Spouse name: None    Number of children: None    Years of education: None    Highest education level: None   Occupational History    None   Social Needs    Financial resource strain: None    Food insecurity     Worry: None     Inability: None    Transportation needs     Medical: None     Non-medical: None   Tobacco Use    Smoking status: Never Smoker    Smokeless tobacco: Never Used   Substance and Sexual Activity    Alcohol use: Yes     Comment: Socially    Drug use: No    Sexual activity: None   Lifestyle    Physical activity     Days per week: None     Minutes per session: None    Stress: None   Relationships    Social connections     Talks on phone: None     Gets together: None     Attends Christian service: None     Active member of club or organization: None     Attends meetings of clubs or organizations: None     Relationship status: None    Intimate partner violence     Fear of current or ex partner: None     Emotionally abused: None     Physically abused: None     Forced sexual activity: None   Other Topics Concern    None   Social History Narrative    None     Family History   Problem Relation Age of Onset    No Known Problems Mother     Other Father 80        dementia    High Blood Pressure Father     Other Brother 58        AAA    No Known Problems Brother     No Known Problems Son     No Known Problems Daughter      Current Outpatient Medications   Medication Sig Dispense Refill    SYNTHROID 25 MCG tablet       hydrochlorothiazide (HYDRODIURIL) 25 MG tablet       amLODIPine (NORVASC) 5 MG tablet       KLOR-CON 10 10 MEQ tablet   3     No current facility-administered medications for this visit. Patient has no known allergies. reviewed    Review of Systems   Constitutional: Negative for unexpected weight change. Respiratory: Negative for shortness of breath. Cardiovascular: Negative for chest pain. Gastrointestinal: Negative for abdominal distention and abdominal pain. Genitourinary: Negative for difficulty urinating, dysuria, flank pain and hematuria. Objective:   Physical Exam  Constitutional:       Appearance: Normal appearance. Genitourinary:     Prostate: Enlarged. Not tender and no nodules present. Rectum: External hemorrhoid present. Neurological:      Mental Status: He is alert. PSA   Date Value Ref Range Status   03/04/2021 4.96 0.00 - 5.40 ng/mL Final     Comment:     When the Total PSA is between 3.00 and 10.00 ng/mL, consider  requesting a Free PSA to aid in diagnosis. 08/20/2020 5.72 (H) 0.00 - 5.40 ng/mL Final     Comment:     When the Total PSA is between 3.00 and 10.00 ng/mL, consider  requesting a Free PSA to aid in diagnosis. 09/13/2019 4.96 0.00 - 5.40 ng/mL Final   04/01/2019 8.87 (H) 0.00 - 5.40 ng/mL Final   06/20/2018 4.15 0.00 - 5.40 ng/mL Final       Assessment:       This is a 59 yo male with h/o HTN, and elevated PSA's s/p negative prostate biopsy on 9/17/18 and with an improved but still elevated interval PSA. I again discussed the option of another prostate biopsy vs prostate MRI vs urine PCA3 testing vs continued closer PSA observation and he wants the latter. He understands the approx 25 % risk for prostate cancer based on the current PSA and that without diagnosis and treatment there is risk for progression if cancer is resent. Plan:      1.  F/U 6 mo for PSA        Ivanna Palacio MD

## 2021-07-14 ENCOUNTER — OFFICE VISIT (OUTPATIENT)
Dept: FAMILY MEDICINE CLINIC | Age: 61
End: 2021-07-14
Payer: COMMERCIAL

## 2021-07-14 VITALS
DIASTOLIC BLOOD PRESSURE: 87 MMHG | WEIGHT: 172 LBS | HEART RATE: 71 BPM | OXYGEN SATURATION: 99 % | TEMPERATURE: 97.3 F | HEIGHT: 68 IN | SYSTOLIC BLOOD PRESSURE: 158 MMHG | BODY MASS INDEX: 26.07 KG/M2

## 2021-07-14 DIAGNOSIS — Z20.822 SUSPECTED COVID-19 VIRUS INFECTION: Primary | ICD-10-CM

## 2021-07-14 PROCEDURE — 99212 OFFICE O/P EST SF 10 MIN: CPT | Performed by: PHYSICIAN ASSISTANT

## 2021-07-14 RX ORDER — BENZONATATE 100 MG/1
1 CAPSULE ORAL 3 TIMES DAILY
COMMUNITY
Start: 2021-07-06 | End: 2021-10-08

## 2021-07-14 RX ORDER — FLUTICASONE PROPIONATE 50 MCG
SPRAY, SUSPENSION (ML) NASAL
COMMUNITY
Start: 2021-05-31

## 2021-07-14 SDOH — ECONOMIC STABILITY: FOOD INSECURITY: WITHIN THE PAST 12 MONTHS, YOU WORRIED THAT YOUR FOOD WOULD RUN OUT BEFORE YOU GOT MONEY TO BUY MORE.: NEVER TRUE

## 2021-07-14 SDOH — ECONOMIC STABILITY: FOOD INSECURITY: WITHIN THE PAST 12 MONTHS, THE FOOD YOU BOUGHT JUST DIDN'T LAST AND YOU DIDN'T HAVE MONEY TO GET MORE.: NEVER TRUE

## 2021-07-14 ASSESSMENT — PATIENT HEALTH QUESTIONNAIRE - PHQ9
SUM OF ALL RESPONSES TO PHQ QUESTIONS 1-9: 0
1. LITTLE INTEREST OR PLEASURE IN DOING THINGS: 0
SUM OF ALL RESPONSES TO PHQ9 QUESTIONS 1 & 2: 0
2. FEELING DOWN, DEPRESSED OR HOPELESS: 0
SUM OF ALL RESPONSES TO PHQ QUESTIONS 1-9: 0
SUM OF ALL RESPONSES TO PHQ QUESTIONS 1-9: 0

## 2021-07-14 ASSESSMENT — ENCOUNTER SYMPTOMS
SINUS PRESSURE: 0
SHORTNESS OF BREATH: 0
CHEST TIGHTNESS: 0
VOMITING: 0
HEMOPTYSIS: 0
SINUS PAIN: 0
SORE THROAT: 0
NAUSEA: 0
ABDOMINAL PAIN: 0
BACK PAIN: 0
HEARTBURN: 0
WHEEZING: 0
COUGH: 1
RHINORRHEA: 0
DIARRHEA: 0

## 2021-07-14 ASSESSMENT — SOCIAL DETERMINANTS OF HEALTH (SDOH): HOW HARD IS IT FOR YOU TO PAY FOR THE VERY BASICS LIKE FOOD, HOUSING, MEDICAL CARE, AND HEATING?: NOT HARD AT ALL

## 2021-07-14 ASSESSMENT — VISUAL ACUITY: OU: 1

## 2021-07-14 NOTE — PROGRESS NOTES
[facility] Encounter  CHIEF COMPLAINT       Chief Complaint   Patient presents with    Chest Congestion     coughing, feels like someone is lying on chest,  x 10days, tx: pt previously on z-pack        HISTORY OF PRESENT ILLNESS   Catherine Pritchard is a 59 y.o. male who presents with:  Cough  This is a new problem. The current episode started 1 to 4 weeks ago (x2 weeks). The problem has been gradually improving. The cough is productive of sputum. Pertinent negatives include no chest pain, chills, ear congestion, ear pain, fever, headaches, heartburn, hemoptysis, myalgias, nasal congestion, postnasal drip, rash, rhinorrhea, sore throat, shortness of breath, sweats, weight loss or wheezing. Nothing aggravates the symptoms. He has tried prescription cough suppressant (azithromycin) for the symptoms. The treatment provided moderate relief. There is no history of asthma, bronchiectasis, bronchitis, COPD, emphysema, environmental allergies or pneumonia. No chest pain, sob, numbness, or tingling. REVIEW OF SYSTEMS     Review of Systems   Constitutional: Negative for activity change, appetite change, chills, fever and weight loss. HENT: Negative for congestion, drooling, ear pain, postnasal drip, rhinorrhea, sinus pressure, sinus pain and sore throat. Eyes: Negative for visual disturbance. Respiratory: Positive for cough. Negative for hemoptysis, chest tightness, shortness of breath and wheezing. Cardiovascular: Negative for chest pain. Gastrointestinal: Negative for abdominal pain, diarrhea, heartburn, nausea and vomiting. Endocrine: Negative for cold intolerance. Genitourinary: Negative for dysuria, flank pain, frequency and hematuria. Musculoskeletal: Negative for arthralgias, back pain and myalgias. Skin: Negative for rash. Allergic/Immunologic: Negative for environmental allergies and food allergies.    Neurological: Negative for weakness, light-headedness, numbness and headaches. Hematological: Does not bruise/bleed easily. PAST MEDICAL HISTORY         Diagnosis Date    Hypertension     on meds x 5 yrs     SURGICAL HISTORY     Patient  has a past surgical history that includes Lithotripsy (1990); Colonoscopy (4/20/16); Vasectomy (12/1992); and pr biopsy of prostate,needle/punch (N/A, 9/17/2018). CURRENT MEDICATIONS       Previous Medications    AMLODIPINE (NORVASC) 5 MG TABLET        BENZONATATE (TESSALON) 100 MG CAPSULE    Take 1 capsule by mouth 3 times daily    FLUTICASONE (FLONASE) 50 MCG/ACT NASAL SPRAY        HYDROCHLOROTHIAZIDE (HYDRODIURIL) 25 MG TABLET        KLOR-CON 10 10 MEQ TABLET        SYNTHROID 25 MCG TABLET         ALLERGIES     Patient is has No Known Allergies. FAMILY HISTORY     Patient'sfamily history includes High Blood Pressure in his father; No Known Problems in his brother, daughter, mother, and son; Other (age of onset: 58) in his brother; Other (age of onset: 80) in his father. SOCIAL HISTORY     Patient  reports that he has never smoked. He has never used smokeless tobacco. He reports current alcohol use. He reports that he does not use drugs. PHYSICAL EXAM     VITALS  BP: (!) 158/87, Temp: 97.3 °F (36.3 °C), Pulse: 71,  , SpO2: 99 %  Physical Exam  Vitals and nursing note reviewed. Constitutional:       General: He is awake. He is not in acute distress. Appearance: Normal appearance. He is well-developed. He is not ill-appearing, toxic-appearing or diaphoretic. HENT:      Head: Normocephalic and atraumatic. Right Ear: Hearing and external ear normal.      Left Ear: Hearing and external ear normal.      Nose: Nose normal.   Eyes:      General: Lids are normal. Vision grossly intact. Gaze aligned appropriately. Conjunctiva/sclera: Conjunctivae normal.   Cardiovascular:      Rate and Rhythm: Normal rate and regular rhythm. Pulses: Normal pulses.       Heart sounds: Normal heart sounds, S1 normal and S2 normal.   Pulmonary:      Effort: Pulmonary effort is normal.      Breath sounds: Normal breath sounds and air entry. Transmitted upper airway sounds present. Musculoskeletal:      Cervical back: Normal range of motion. Skin:     General: Skin is warm. Capillary Refill: Capillary refill takes less than 2 seconds. Neurological:      Mental Status: He is alert and oriented to person, place, and time. Gait: Gait is intact. Psychiatric:         Attention and Perception: Attention normal.         Mood and Affect: Mood normal.         Speech: Speech normal.         Behavior: Behavior normal. Behavior is cooperative. READY CARE COURSE   Labs:  No results found for this visit on 07/14/21. IMAGING:  No orders to display     Scheduled Meds:  Continuous Infusions:  PRN Meds:. PROCEDURES:  FINAL IMPRESSION      1. Suspected COVID-19 virus infection      DISPOSITION/PLAN   1. Most likely bronchitis. Patient is being managed by his PCP. Told to come get COVID-19 test. Discussed signs and symptoms which require immediate follow-up in ED/call to 911. Patient verbalized understanding. On this date 7/14/2021 I have spent 15 minutes reviewing previous notes, test results and face to face with the patient discussing the diagnosis and importance of compliance with the treatment plan as well as documenting on the day of the visit. PATIENT REFERRED TO:  Return if symptoms worsen or fail to improve. DISCHARGE MEDICATIONS:  New Prescriptions    No medications on file     Cannot display discharge medications since this is not an admission.        Lavina Lesch, [place]

## 2021-07-14 NOTE — PROGRESS NOTES
[address] [facility] Encounter  CHIEF COMPLAINT       Chief Complaint   Patient presents with    Chest Congestion     coughing, feels like someone is lying on chest,  x 10days, tx: pt previously on z-pack        HISTORY OF PRESENT ILLNESS   Kitty Moura is a 59 y.o. male who presents with:  HPI  REVIEW OF SYSTEMS     Review of Systems  PAST MEDICAL HISTORY         Diagnosis Date    Hypertension     on meds x 5 yrs     SURGICAL HISTORY     Patient  has a past surgical history that includes Lithotripsy (1990); Colonoscopy (4/20/16); Vasectomy (12/1992); and pr biopsy of prostate,needle/punch (N/A, 9/17/2018). CURRENT MEDICATIONS       Previous Medications    AMLODIPINE (NORVASC) 5 MG TABLET        BENZONATATE (TESSALON) 100 MG CAPSULE    Take 1 capsule by mouth 3 times daily    FLUTICASONE (FLONASE) 50 MCG/ACT NASAL SPRAY        HYDROCHLOROTHIAZIDE (HYDRODIURIL) 25 MG TABLET        KLOR-CON 10 10 MEQ TABLET        SYNTHROID 25 MCG TABLET         ALLERGIES     Patient is has No Known Allergies. FAMILY HISTORY     Patient'sfamily history includes High Blood Pressure in his father; No Known Problems in his brother, daughter, mother, and son; Other (age of onset: 58) in his brother; Other (age of onset: 80) in his father. SOCIAL HISTORY     Patient  reports that he has never smoked. He has never used smokeless tobacco. He reports current alcohol use. He reports that he does not use drugs. PHYSICAL EXAM     VITALS  BP: (!) 158/87, Temp: 97.3 °F (36.3 °C), Pulse: 71,  , SpO2: 99 %  Physical Exam  READY CARE COURSE   Labs:  No results found for this visit on 07/14/21. IMAGING:  No orders to display     Scheduled Meds:  Continuous Infusions:  PRN Meds:. PROCEDURES:  FINAL IMPRESSION      1. Suspected COVID-19 virus infection      DISPOSITION/PLAN           PATIENT REFERRED TO:  No follow-ups on file.     DISCHARGE MEDICATIONS:  New Prescriptions    No medications on file     Cannot display discharge medications since this is not an admission.        Romona Castleman, [place]

## 2021-07-15 DIAGNOSIS — Z20.822 SUSPECTED COVID-19 VIRUS INFECTION: ICD-10-CM

## 2021-07-15 LAB — SARS-COV-2, PCR: NOT DETECTED

## 2021-10-04 ENCOUNTER — TELEPHONE (OUTPATIENT)
Dept: UROLOGY | Age: 61
End: 2021-10-04

## 2021-10-04 DIAGNOSIS — R97.20 ELEVATED PSA: Primary | ICD-10-CM

## 2021-10-08 ENCOUNTER — OFFICE VISIT (OUTPATIENT)
Dept: UROLOGY | Age: 61
End: 2021-10-08
Payer: COMMERCIAL

## 2021-10-08 VITALS
HEIGHT: 68 IN | SYSTOLIC BLOOD PRESSURE: 138 MMHG | BODY MASS INDEX: 25.76 KG/M2 | DIASTOLIC BLOOD PRESSURE: 86 MMHG | WEIGHT: 170 LBS | HEART RATE: 72 BPM

## 2021-10-08 DIAGNOSIS — R97.20 ELEVATED PSA: Primary | ICD-10-CM

## 2021-10-08 PROCEDURE — G8427 DOCREV CUR MEDS BY ELIG CLIN: HCPCS | Performed by: UROLOGY

## 2021-10-08 PROCEDURE — 1036F TOBACCO NON-USER: CPT | Performed by: UROLOGY

## 2021-10-08 PROCEDURE — G8484 FLU IMMUNIZE NO ADMIN: HCPCS | Performed by: UROLOGY

## 2021-10-08 PROCEDURE — 3017F COLORECTAL CA SCREEN DOC REV: CPT | Performed by: UROLOGY

## 2021-10-08 PROCEDURE — G8419 CALC BMI OUT NRM PARAM NOF/U: HCPCS | Performed by: UROLOGY

## 2021-10-08 PROCEDURE — 99214 OFFICE O/P EST MOD 30 MIN: CPT | Performed by: UROLOGY

## 2021-10-08 RX ORDER — LEVOTHYROXINE SODIUM 0.05 MG/1
TABLET ORAL
COMMUNITY
Start: 2021-10-05

## 2021-10-08 ASSESSMENT — ENCOUNTER SYMPTOMS
SHORTNESS OF BREATH: 0
ABDOMINAL DISTENTION: 0
ABDOMINAL PAIN: 0

## 2021-10-08 NOTE — PROGRESS NOTES
Subjective:      Patient ID: Imer Cha is a 59 y.o. male    HPI  This is a 57 yo male with h/o HTN, and elevated PSA's s/p negative prostate biopsy on 9/17/18, back in follow-up. Since last seen on 3/11/21, he has no hematuria, dysuria or pain. He has no frequency or urgency or pain. He has no wt loss or abnl bone pains. He voids with a good flow. He has no new medical or surgical problems. I reviewed the interval PSA today.      Prostate biopsy 9/18: neg, PV: 34.8 cc    Past Medical History:   Diagnosis Date    Hypertension     on meds x 5 yrs     Past Surgical History:   Procedure Laterality Date    COLONOSCOPY  4/20/16        LITHOTRIPSY  1990    FL BIOPSY OF PROSTATE,NEEDLE/PUNCH N/A 9/17/2018    TRANSRECTAL ULTRASOUND GUIDED PROSTATE BIOPSY performed by Dominga Bernstein MD at [place] [address]  12/1992     Social History     Socioeconomic History    Marital status:      Spouse name: None    Number of children: None    Years of education: None    Highest education level: None   Occupational History    None   Tobacco Use    Smoking status: Never Smoker    Smokeless tobacco: Never Used   Vaping Use    Vaping Use: Never used   Substance and Sexual Activity    Alcohol use: Yes     Comment: Socially    Drug use: No    Sexual activity: None   Other Topics Concern    None   Social History Narrative    None     Social Determinants of Health     Financial Resource Strain: Low Risk     Difficulty of Paying Living Expenses: Not hard at all   Food Insecurity: No Food Insecurity    Worried About Running Out of Food in the Last Year: Never true    Jonathon of Food in the Last Year: Never true   Transportation Needs:     Lack of Transportation (Medical):      Lack of Transportation (Non-Medical):    Physical Activity:     Days of Exercise per Week:     Minutes of Exercise per Session:    Stress:     Feeling of Stress :    Social Connections:     Frequency of 0.00 - 5.40 ng/mL Final   04/01/2019 8.87 (H) 0.00 - 5.40 ng/mL Final       Assessment: This is a 59 yo male with h/o HTN, BPH by exam without LUTs, and elevated PSA's s/p negative prostate biopsy on 9/17/18 and with a persistent PSA elevation. I again discussed the option of another prostate biopsy vs prostate MRI vs urine PCA3 testing vs continued closer PSA observation and he wants the latter. He understands the approx 25 % risk for prostate cancer based on the current PSA and that without diagnosis and treatment there is risk for progression if cancer is present. He will consider a prostate MRI if the PSA continues to rise. Plan:      1.  F/U 6 mo for PSA        Valentina Veras MD

## 2021-12-11 ENCOUNTER — OFFICE VISIT (OUTPATIENT)
Dept: FAMILY MEDICINE CLINIC | Age: 61
End: 2021-12-11
Payer: COMMERCIAL

## 2021-12-11 VITALS
OXYGEN SATURATION: 98 % | HEIGHT: 68 IN | SYSTOLIC BLOOD PRESSURE: 124 MMHG | WEIGHT: 172 LBS | DIASTOLIC BLOOD PRESSURE: 80 MMHG | BODY MASS INDEX: 26.07 KG/M2 | TEMPERATURE: 95.9 F | HEART RATE: 87 BPM

## 2021-12-11 DIAGNOSIS — J01.10 ACUTE NON-RECURRENT FRONTAL SINUSITIS: Primary | ICD-10-CM

## 2021-12-11 LAB
Lab: NORMAL
PERFORMING INSTRUMENT: NORMAL
QC PASS/FAIL: NORMAL
SARS-COV-2, POC: NORMAL

## 2021-12-11 PROCEDURE — G8419 CALC BMI OUT NRM PARAM NOF/U: HCPCS

## 2021-12-11 PROCEDURE — G8427 DOCREV CUR MEDS BY ELIG CLIN: HCPCS

## 2021-12-11 PROCEDURE — G8484 FLU IMMUNIZE NO ADMIN: HCPCS

## 2021-12-11 PROCEDURE — 99213 OFFICE O/P EST LOW 20 MIN: CPT

## 2021-12-11 PROCEDURE — 3017F COLORECTAL CA SCREEN DOC REV: CPT

## 2021-12-11 PROCEDURE — 87426 SARSCOV CORONAVIRUS AG IA: CPT

## 2021-12-11 PROCEDURE — 1036F TOBACCO NON-USER: CPT

## 2021-12-11 ASSESSMENT — ENCOUNTER SYMPTOMS
WHEEZING: 0
SORE THROAT: 0
SHORTNESS OF BREATH: 0
NAUSEA: 0
DIARRHEA: 0
EYE PAIN: 0
TROUBLE SWALLOWING: 0
VOMITING: 0
EYE DISCHARGE: 0
SINUS PAIN: 0
SINUS PRESSURE: 0
RHINORRHEA: 0
COLOR CHANGE: 0
BACK PAIN: 0
FACIAL SWELLING: 0
CHEST TIGHTNESS: 0
EYE ITCHING: 0
ABDOMINAL PAIN: 0
APNEA: 0
COUGH: 0

## 2021-12-11 NOTE — PATIENT INSTRUCTIONS
a hot, wet towel or a warm gel pack on your face 3 or 4 times a day for 5 to 10 minutes each time. · Try a decongestant nasal spray like oxymetazoline (Afrin). Do not use it for more than 3 days in a row. Using it for more than 3 days can make your congestion worse. When should you call for help? Call your doctor now or seek immediate medical care if:    · You have new or worse swelling or redness in your face or around your eyes.     · You have a new or higher fever. Watch closely for changes in your health, and be sure to contact your doctor if:    · You have new or worse facial pain.     · The mucus from your nose becomes thicker (like pus) or has new blood in it.     · You are not getting better as expected. Where can you learn more? Go to [email or web address] org and sign in to your [employer] account. Enter X998 in the [employer] box to learn more about \"Sinusitis: Care Instructions. \"     If you do not have an account, please click on the \"Sign Up Now\" link. Current as of: December 2, 2020               Content Version: 13.0  © 9977-6423 [employer]. Care instructions adapted under license by [facility] ([facility]). If you have questions about a medical condition or this instruction, always ask your healthcare professional. [address]  any warranty or liability for your use of this information.

## 2021-12-11 NOTE — PROGRESS NOTES
[address] Encounter  CHIEF COMPLAINT       Chief Complaint   Patient presents with    Nasal Congestion     blowing out blood tx: cold and sinus medication     Generalized Body Aches     x1day    Fever     x1day tx: OTC NSAIDS       HISTORY OF PRESENT ILLNESS   Shelton Snow is a 59 y.o. male who presents with:  HPI  Symptoms of sinus congestion body aches and fever for 1 day. He reports that he is still getting over a cough that he has had for several weeks. He took ibuprofen for the symptoms of body aches and fever which have improved them  REVIEW OF SYSTEMS     Review of Systems   Constitutional: Positive for fever. Negative for appetite change, chills, diaphoresis and fatigue. HENT: Positive for congestion. Negative for ear discharge, ear pain, facial swelling, hearing loss, mouth sores, postnasal drip, rhinorrhea, sinus pressure, sinus pain, sore throat and trouble swallowing. Eyes: Negative for pain, discharge and itching. Respiratory: Negative for apnea, cough, chest tightness, shortness of breath and wheezing. Cardiovascular: Negative for chest pain and palpitations. Gastrointestinal: Negative for abdominal pain, diarrhea, nausea and vomiting. Endocrine: Negative for cold intolerance and heat intolerance. Genitourinary: Negative for decreased urine volume and difficulty urinating. Musculoskeletal: Positive for myalgias. Negative for arthralgias and back pain. Skin: Negative for color change, pallor and rash. Neurological: Positive for headaches. Negative for dizziness, syncope, weakness and light-headedness. Hematological: Negative for adenopathy. Psychiatric/Behavioral: Negative for behavioral problems, confusion and sleep disturbance. PAST MEDICAL HISTORY         Diagnosis Date    Hypertension     on meds x 5 yrs     SURGICAL HISTORY     Patient  has a past surgical history that includes Lithotripsy (1990);  Colonoscopy (4/20/16); Vasectomy (12/1992); and pr biopsy of prostate,needle/punch (N/A, 9/17/2018). CURRENT MEDICATIONS       Previous Medications    AMLODIPINE (NORVASC) 5 MG TABLET        FLUTICASONE (FLONASE) 50 MCG/ACT NASAL SPRAY        HYDROCHLOROTHIAZIDE (HYDRODIURIL) 25 MG TABLET        KLOR-CON 10 10 MEQ TABLET        LEVOTHYROXINE (SYNTHROID) 50 MCG TABLET         ALLERGIES     Patient is has No Known Allergies. FAMILY HISTORY     Patient'sfamily history includes High Blood Pressure in his father; No Known Problems in his brother, daughter, mother, and son; Other (age of onset: 58) in his brother; Other (age of onset: 80) in his father. HISTORY     Patient  reports that he has never smoked. He has never used smokeless tobacco. He reports current alcohol use. He reports that he does not use drugs. PHYSICAL EXAM     VITALS  BP: 124/80, Temp: 95.9 °F (35.5 °C), Pulse: 87,  , SpO2: 98 %  Physical Exam  Constitutional:       General: He is not in acute distress. Appearance: Normal appearance. He is not ill-appearing, toxic-appearing or diaphoretic. HENT:      Head: Normocephalic. Right Ear: Tympanic membrane, ear canal and external ear normal. No middle ear effusion. There is no impacted cerumen. No mastoid tenderness. Tympanic membrane is not perforated, erythematous or bulging. Left Ear: Tympanic membrane, ear canal and external ear normal.  No middle ear effusion. There is no impacted cerumen. No mastoid tenderness. Tympanic membrane is not perforated, erythematous or bulging. Nose: Congestion and rhinorrhea present. Mouth/Throat:      Mouth: Mucous membranes are moist.      Pharynx: Oropharynx is clear. No pharyngeal swelling, oropharyngeal exudate or posterior oropharyngeal erythema. Tonsils: No tonsillar exudate or tonsillar abscesses. 0 on the right. 0 on the left. Eyes:      General:         Right eye: No discharge. Left eye: No discharge.    Cardiovascular:      Rate and Rhythm: Normal rate and regular rhythm. Pulses: Normal pulses. Heart sounds: Normal heart sounds. No murmur heard. No gallop. Pulmonary:      Effort: Pulmonary effort is normal. No respiratory distress. Breath sounds: No stridor. Wheezing present. No rhonchi or rales. Chest:      Chest wall: No tenderness. Abdominal:      General: Abdomen is flat. There is no distension. Palpations: Abdomen is soft. Tenderness: There is no abdominal tenderness. Musculoskeletal:         General: Normal range of motion. Cervical back: No rigidity or tenderness. Lymphadenopathy:      Cervical: No cervical adenopathy. Skin:     General: Skin is warm and dry. Capillary Refill: Capillary refill takes less than 2 seconds. Coloration: Skin is not pale. Neurological:      General: No focal deficit present. Mental Status: He is alert and oriented to person, place, and time. Mental status is at baseline. Psychiatric:         Mood and Affect: Mood normal.         Behavior: Behavior normal.       READY CARE COURSE     Orders Placed This Encounter   Procedures    POCT COVID-19, Antigen     Order Specific Question:   Is this test for diagnosis or screening? Answer:   Diagnosis of ill patient     Order Specific Question:   Symptomatic for COVID-19 as defined by CDC? Answer:   Yes     Order Specific Question:   Date of Symptom Onset     Answer:   12/3/2021     Order Specific Question:   Hospitalized for COVID-19? Answer:   No     Order Specific Question:   Admitted to ICU for COVID-19? Answer:   No     Order Specific Question:   Employed in healthcare setting? Answer:   No     Order Specific Question:   Resident in a congregate (group) care setting? Answer:   No     Order Specific Question:   Pregnant: Answer:   No     Order Specific Question:   Previously tested for COVID-19?      Answer:   Yes        Labs:  No results found for this visit on 12/11/21. IMAGING:  No orders to display     Scheduled Meds:  Continuous Infusions:  PRN Meds:. PROCEDURES:  FINAL IMPRESSION      1. Acute non-recurrent frontal sinusitis        DISPOSITION/PLAN     HISTORY OF PRESENT ILLNESS   Laurel Whyte is a 59 y.o. male who presents with  congestion body aches and fever for 1 day. He reports that he is still getting over a cough that he has had for several weeks. Patient is currently taking Augmentin that he started yesterday for sinus infection. Pt is afebrile has nontoxic appearance and VS are stable. On exam ears bilaterally normal, pharynx mildly erythemic posteriorly postnasal drip is noted. No tonsillar enlargement. Neck is supple, no masses. Lung sounds are significant for scant expiratory wheeze at the end of respiration. Heart sounds normal.  Rapid Covid test is ordered. The result is negative. I believe symptoms are consistent with acute sinusitis. Secondary to a viral infection. Which she is currently being treated for patient will be educated to continue current therapy. PATIENT REFERRED TO:  Return if symptoms worsen or fail to improve, for Follow up with PCP. DISCHARGE MEDICATIONS:  New Prescriptions    No medications on file     Cannot display discharge medications since this is not an admission.        Claudette Huggins, APRN - CNP

## 2022-04-07 DIAGNOSIS — R97.20 ELEVATED PSA: ICD-10-CM

## 2022-04-07 LAB — PROSTATE SPECIFIC ANTIGEN: 5.4 NG/ML (ref 0–4)

## 2022-04-08 ENCOUNTER — OFFICE VISIT (OUTPATIENT)
Dept: UROLOGY | Age: 62
End: 2022-04-08
Payer: COMMERCIAL

## 2022-04-08 VITALS
DIASTOLIC BLOOD PRESSURE: 84 MMHG | HEIGHT: 68 IN | WEIGHT: 180 LBS | OXYGEN SATURATION: 99 % | BODY MASS INDEX: 27.28 KG/M2 | HEART RATE: 68 BPM | SYSTOLIC BLOOD PRESSURE: 118 MMHG

## 2022-04-08 DIAGNOSIS — R97.20 ELEVATED PSA: Primary | ICD-10-CM

## 2022-04-08 PROCEDURE — G8419 CALC BMI OUT NRM PARAM NOF/U: HCPCS | Performed by: UROLOGY

## 2022-04-08 PROCEDURE — 99213 OFFICE O/P EST LOW 20 MIN: CPT | Performed by: UROLOGY

## 2022-04-08 PROCEDURE — 3017F COLORECTAL CA SCREEN DOC REV: CPT | Performed by: UROLOGY

## 2022-04-08 PROCEDURE — G8427 DOCREV CUR MEDS BY ELIG CLIN: HCPCS | Performed by: UROLOGY

## 2022-04-08 PROCEDURE — 1036F TOBACCO NON-USER: CPT | Performed by: UROLOGY

## 2022-04-08 ASSESSMENT — ENCOUNTER SYMPTOMS: ABDOMINAL PAIN: 0

## 2022-04-08 NOTE — PROGRESS NOTES
Subjective:      Patient ID: Janette Charles is a 57 y.o. male    HPI  This is a 55 yo male with h/o HTN, and elevated PSA's s/p negative prostate biopsy on 9/17/18, back in follow-up. Since last seen on 10/8/21, he has no hematuria, dysuria or pain. He has no frequency or urgency or pain. He has no wt loss or abnl bone pains. He voids with a good flow and no PVD. He has no new medical or surgical problems. I reviewed the interval PSA today.      Prostate biopsy 9/18: neg, PV: 34.8 cc    Past Medical History:   Diagnosis Date    Hypertension     on meds x 5 yrs     Past Surgical History:   Procedure Laterality Date    COLONOSCOPY  4/20/16        LITHOTRIPSY  1990    OR BIOPSY OF PROSTATE,NEEDLE/PUNCH N/A 9/17/2018    TRANSRECTAL ULTRASOUND GUIDED PROSTATE BIOPSY performed by Adwoa Ochoa MD at [place] [address]  12/1992     Social History     Socioeconomic History    Marital status:      Spouse name: Not on file    Number of children: Not on file    Years of education: Not on file    Highest education level: Not on file   Occupational History    Not on file   Tobacco Use    Smoking status: Never Smoker    Smokeless tobacco: Never Used   Vaping Use    Vaping Use: Never used   Substance and Sexual Activity    Alcohol use: Yes     Comment: Socially    Drug use: No    Sexual activity: Not on file   Other Topics Concern    Not on file   Social History Narrative    Not on file     Social Determinants of Health     Financial Resource Strain: Low Risk     Difficulty of Paying Living Expenses: Not hard at all   Food Insecurity: No Food Insecurity    Worried About [employer] [employer] in the Last Year: Never true    [facility] in the Last Year: Never true   Transportation Needs:     Lack of Transportation (Medical): Not on file    Lack of Transportation (Non-Medical):  Not on file   Physical Activity:     Days of Exercise per Week: Not on file    Minutes of Exercise per Session: Not on file   Stress:     Feeling of Stress : Not on file   Social Connections:     Frequency of Communication with Friends and Family: Not on file    Frequency of Social Gatherings with Friends and Family: Not on file    Attends Bahai Services: Not on file    Active Member of Clubs or Organizations: Not on file    Attends Club or Organization Meetings: Not on file    Marital Status: Not on file   Intimate Partner Violence:     Fear of Current or Ex-Partner: Not on file    Emotionally Abused: Not on file    Physically Abused: Not on file    Sexually Abused: Not on file   Housing Stability:     Unable to Pay for Housing in the Last Year: Not on file    Number of Places Lived in the Last Year: Not on file    Unstable Housing in the Last Year: Not on file     Family History   Problem Relation Age of Onset    No Known Problems Mother    Silvia Cassidy Other Father 80        dementia    High Blood Pressure Father     Other Brother 58        AAA    No Known Problems Brother     No Known Problems Son     No Known Problems Daughter      Current Outpatient Medications   Medication Sig Dispense Refill    levothyroxine (SYNTHROID) 50 MCG tablet       fluticasone (FLONASE) 50 MCG/ACT nasal spray       hydrochlorothiazide (HYDRODIURIL) 25 MG tablet       amLODIPine (NORVASC) 5 MG tablet       KLOR-CON 10 10 MEQ tablet   3     No current facility-administered medications for this visit. Patient has no known allergies. reviewed      Review of Systems   Constitutional: Negative for unexpected weight change. Gastrointestinal: Negative for abdominal pain. Genitourinary: Negative for difficulty urinating, dysuria, flank pain and hematuria. Objective:   Physical Exam  Genitourinary:     Prostate: Enlarged. Not tender and no nodules present. Rectum: No external hemorrhoid. Comments: Prostate is 2-3 +  Neurological:      Mental Status: He is alert.             PSA   Date Value Ref Range Status   04/07/2022 5.40 (H) 0.00 - 4.00 ng/mL Final   10/05/2021 5.54 (H) 0.00 - 4.00 ng/mL Final   03/04/2021 4.96 0.00 - 5.40 ng/mL Final     Comment:     When the Total PSA is between 3.00 and 10.00 ng/mL, consider  requesting a Free PSA to aid in diagnosis. 08/20/2020 5.72 (H) 0.00 - 5.40 ng/mL Final     Comment:     When the Total PSA is between 3.00 and 10.00 ng/mL, consider  requesting a Free PSA to aid in diagnosis. 09/13/2019 4.96 0.00 - 5.40 ng/mL Final       Assessment: This is a 55 yo male with h/o HTN, BPH by exam without LUTs, and elevated PSA's s/p negative prostate biopsy on 9/17/18 and with a persistent PSA elevation that remains relatively stable.   I again discussed the option of another prostate biopsy vs prostate MRI vs urine PCA3 testing vs continued closer PSA observation and he wants the latter. He understands the approx 25 % risk for prostate cancer based on the current PSA and that without diagnosis and treatment there is risk for progression if cancer is present. He will consider a prostate MRI if the PSA continues to rise. Plan:      1.  F/U 6 mo for PSA        Rena Powell MD

## 2022-06-03 ENCOUNTER — HOSPITAL ENCOUNTER (OUTPATIENT)
Dept: GENERAL RADIOLOGY | Age: 62
Discharge: HOME | End: 2022-06-05
Payer: COMMERCIAL

## 2022-06-03 DIAGNOSIS — M79.18 PAIN IN RIGHT BUTTOCK: ICD-10-CM

## 2022-06-03 DIAGNOSIS — M79.18 RIGHT BUTTOCK PAIN: ICD-10-CM

## 2022-06-03 PROCEDURE — 73502 X-RAY EXAM HIP UNI 2-3 VIEWS: CPT

## 2022-06-03 PROCEDURE — 72110 X-RAY EXAM L-2 SPINE 4/>VWS: CPT

## 2023-01-26 DIAGNOSIS — R97.20 ELEVATED PSA: Primary | ICD-10-CM

## 2023-02-02 DIAGNOSIS — R97.20 ELEVATED PSA: ICD-10-CM

## 2023-02-02 LAB — PROSTATE SPECIFIC ANTIGEN: 5.27 NG/ML (ref 0–4)

## 2023-02-09 ENCOUNTER — OFFICE VISIT (OUTPATIENT)
Dept: UROLOGY | Age: 63
End: 2023-02-09
Payer: MEDICARE

## 2023-02-09 VITALS
DIASTOLIC BLOOD PRESSURE: 80 MMHG | HEART RATE: 71 BPM | SYSTOLIC BLOOD PRESSURE: 138 MMHG | WEIGHT: 190 LBS | BODY MASS INDEX: 28.79 KG/M2 | HEIGHT: 68 IN

## 2023-02-09 DIAGNOSIS — N40.0 BPH WITHOUT OBSTRUCTION/LOWER URINARY TRACT SYMPTOMS: ICD-10-CM

## 2023-02-09 DIAGNOSIS — R97.20 ELEVATED PSA: Primary | ICD-10-CM

## 2023-02-09 PROCEDURE — 99213 OFFICE O/P EST LOW 20 MIN: CPT | Performed by: UROLOGY

## 2023-02-09 PROCEDURE — 1123F ACP DISCUSS/DSCN MKR DOCD: CPT | Performed by: UROLOGY

## 2023-02-09 ASSESSMENT — ENCOUNTER SYMPTOMS
ABDOMINAL DISTENTION: 0
ABDOMINAL PAIN: 0

## 2023-02-09 NOTE — PROGRESS NOTES
Subjective:      Patient ID: Humaira Soto is a 70 y.o. male    HPI This is a 69 yo male with h/o HTN, and elevated PSA's s/p negative prostate biopsy on 9/17/18, back in follow-up. Since last seen on 4/8/22, he has no hematuria, dysuria or pain. He has no frequency or urgency or pain. He has no wt loss or abnl bone pains. He has no new medical or surgical problems. He did have a cortisone shot in the shoulder recently. I reviewed the interval PSA today. Prostate biopsy 9/18: neg, PV: 34.8 cc    Past Medical History:   Diagnosis Date    Hypertension     on meds x 5 yrs     Past Surgical History:   Procedure Laterality Date    COLONOSCOPY  4/20/16        LITHOTRIPSY  1990    [place] BIOPSY OF PROSTATE,NEEDLE/PUNCH N/A 9/17/2018    TRANSRECTAL ULTRASOUND GUIDED PROSTATE BIOPSY performed by Janice Hansen MD at [address] [place] [address]  12/1992     Social History     Socioeconomic History    Marital status:    Tobacco Use    Smoking status: Never    Smokeless tobacco: Never   Vaping Use    Vaping Use: Never used   Substance and Sexual Activity    Alcohol use: Yes     Comment: Socially    Drug use: No     Family History   Problem Relation Age of Onset    No Known Problems Mother     Other Father 80        dementia    High Blood Pressure Father     Other Brother 58        AAA    No Known Problems Brother     No Known Problems Son     No Known Problems Daughter      Current Outpatient Medications   Medication Sig Dispense Refill    levothyroxine (SYNTHROID) 50 MCG tablet       fluticasone (FLONASE) 50 MCG/ACT nasal spray       hydrochlorothiazide (HYDRODIURIL) 25 MG tablet       amLODIPine (NORVASC) 5 MG tablet       KLOR-CON 10 10 MEQ tablet   3     No current facility-administered medications for this visit. Patient has no known allergies. reviewed      Review of Systems   Constitutional:  Negative for unexpected weight change.    Gastrointestinal:  Negative for abdominal distention and abdominal pain. Genitourinary:  Negative for difficulty urinating, dysuria, flank pain and hematuria. Objective:   Physical Exam  Constitutional:       Appearance: Normal appearance. Genitourinary:     Prostate: Enlarged. Not tender and no nodules present. Rectum: External hemorrhoid present. Neurological:      Mental Status: He is alert. PSA   Date Value Ref Range Status   02/02/2023 5.27 (H) 0.00 - 4.00 ng/mL Final   04/07/2022 5.40 (H) 0.00 - 4.00 ng/mL Final   10/05/2021 5.54 (H) 0.00 - 4.00 ng/mL Final   03/04/2021 4.96 0.00 - 5.40 ng/mL Final     Comment:     When the Total PSA is between 3.00 and 10.00 ng/mL, consider  requesting a Free PSA to aid in diagnosis. 08/20/2020 5.72 (H) 0.00 - 5.40 ng/mL Final     Comment:     When the Total PSA is between 3.00 and 10.00 ng/mL, consider  requesting a Free PSA to aid in diagnosis. Assessment: This is a 69 yo male with h/o HTN, BPH by exam without LUTs, and elevated PSA's s/p negative prostate biopsy on 9/17/18 and with a persistent PSA elevation that remains relatively stable. I again discussed the option of another prostate biopsy vs prostate MRI vs urine Select Mdx testing vs continued closer PSA observation and he wants the latter. He understands the approx 25 % risk for prostate cancer based on the current PSA and that without diagnosis and treatment there is risk for progression if cancer is present. He will consider a prostate MRI if the PSA continues to rise.       Plan:      F/U 6 mo for PSA        Dolph Hashimoto, MD

## 2023-08-15 ENCOUNTER — TELEPHONE (OUTPATIENT)
Dept: UROLOGY | Age: 63
End: 2023-08-15

## 2023-08-15 DIAGNOSIS — R97.20 ELEVATED PSA: Primary | ICD-10-CM

## 2023-08-15 DIAGNOSIS — R97.20 ELEVATED PSA: ICD-10-CM

## 2023-08-15 LAB — PSA SERPL-MCNC: 5.75 NG/ML (ref 0–4)

## 2023-08-17 ENCOUNTER — OFFICE VISIT (OUTPATIENT)
Dept: UROLOGY | Age: 63
End: 2023-08-17
Payer: MEDICARE

## 2023-08-17 VITALS
SYSTOLIC BLOOD PRESSURE: 152 MMHG | HEART RATE: 69 BPM | DIASTOLIC BLOOD PRESSURE: 82 MMHG | BODY MASS INDEX: 28.04 KG/M2 | OXYGEN SATURATION: 98 % | WEIGHT: 185 LBS | HEIGHT: 68 IN

## 2023-08-17 DIAGNOSIS — R97.20 ELEVATED PSA: Primary | ICD-10-CM

## 2023-08-17 PROCEDURE — 99213 OFFICE O/P EST LOW 20 MIN: CPT | Performed by: UROLOGY

## 2023-08-17 PROCEDURE — 1123F ACP DISCUSS/DSCN MKR DOCD: CPT | Performed by: UROLOGY

## 2023-08-17 ASSESSMENT — ENCOUNTER SYMPTOMS
ABDOMINAL PAIN: 0
ABDOMINAL DISTENTION: 0

## 2023-08-17 NOTE — PROGRESS NOTES
Subjective:      Patient ID: Zetta Schwab is a 70 y.o. male    HPI  This is a 69 yo male with h/o HTN, and elevated PSA's s/p negative prostate biopsy on 9/17/18, back in follow-up. Since last seen on 2/9/23, he has no hematuria, dysuria or pain. He has no frequency or urgency or pain. He has no wt loss or abnl bone pains. He has no new medical or surgical problems. I reviewed the interval PSA today. Prostate biopsy 9/18: neg, PV: 34.8 cc    Past Medical History:   Diagnosis Date    Hypertension     on meds x 5 yrs     Past Surgical History:   Procedure Laterality Date    COLONOSCOPY  4/20/16        LITHOTRIPSY  1990    [place] PROSTATE NEEDLE BIOPSY ANY APPROACH N/A 9/17/2018    TRANSRECTAL ULTRASOUND GUIDED PROSTATE BIOPSY performed by John Dash MD at [address] [place] [address]  12/1992     Social History     Socioeconomic History    Marital status:      Spouse name: None    Number of children: None    Years of education: None    Highest education level: None   Tobacco Use    Smoking status: Never    Smokeless tobacco: Never   Vaping Use    Vaping Use: Never used   Substance and Sexual Activity    Alcohol use: Yes     Comment: Socially    Drug use: No     Family History   Problem Relation Age of Onset    No Known Problems Mother     Other Father 80        dementia    High Blood Pressure Father     Other Brother 58        AAA    No Known Problems Brother     No Known Problems Son     No Known Problems Daughter      Current Outpatient Medications   Medication Sig Dispense Refill    levothyroxine (SYNTHROID) 50 MCG tablet       fluticasone (FLONASE) 50 MCG/ACT nasal spray       hydrochlorothiazide (HYDRODIURIL) 25 MG tablet       amLODIPine (NORVASC) 5 MG tablet       KLOR-CON 10 10 MEQ tablet   3     No current facility-administered medications for this visit. Patient has no known allergies. reviewed      Review of Systems   Constitutional:  Negative for unexpected weight change.

## 2024-02-14 LAB — PROSTATE SPECIFIC AG (NG/ML) IN SER/PLAS EXTERNAL: 4.83 NG/ML (ref ?–4)

## 2024-02-22 ENCOUNTER — OFFICE VISIT (OUTPATIENT)
Dept: UROLOGY | Age: 64
End: 2024-02-22
Payer: MEDICARE

## 2024-02-22 VITALS
DIASTOLIC BLOOD PRESSURE: 78 MMHG | WEIGHT: 190 LBS | SYSTOLIC BLOOD PRESSURE: 126 MMHG | HEIGHT: 68 IN | HEART RATE: 73 BPM | BODY MASS INDEX: 28.79 KG/M2

## 2024-02-22 DIAGNOSIS — R97.20 ELEVATED PSA: Primary | ICD-10-CM

## 2024-02-22 PROCEDURE — 1123F ACP DISCUSS/DSCN MKR DOCD: CPT | Performed by: UROLOGY

## 2024-02-22 PROCEDURE — 99213 OFFICE O/P EST LOW 20 MIN: CPT | Performed by: UROLOGY

## 2024-02-22 ASSESSMENT — ENCOUNTER SYMPTOMS
ABDOMINAL PAIN: 0
ABDOMINAL DISTENTION: 0

## 2024-02-22 NOTE — PROGRESS NOTES
Subjective:      Patient ID: Jerad Maharaj is a 64 y.o. male    HPI  This is a 63 yo male with h/o HTN, and elevated PSA's s/p negative prostate biopsy on 9/17/18, back in follow-up. Since last seen on 2/9/23, he has no hematuria, dysuria or pain. He has no frequency or urgency or pain. He has no wt loss or abnl bone pains. He has no new medical or surgical problems. I reviewed the interval PSA today.      Prostate biopsy 9/18: neg, PV: 34.8 cc    Past Medical History:   Diagnosis Date    Hypertension     on meds x 5 yrs     Past Surgical History:   Procedure Laterality Date    COLONOSCOPY  4/20/16        LITHOTRIPSY  1990    [place] PROSTATE NEEDLE BIOPSY ANY APPROACH N/A 9/17/2018    TRANSRECTAL ULTRASOUND GUIDED PROSTATE BIOPSY performed by Roland Zayas MD at [facility]    VASECTOMY  12/1992     Social History     Socioeconomic History    Marital status:      Spouse name: None    Number of children: None    Years of education: None    Highest education level: None   Tobacco Use    Smoking status: Never    Smokeless tobacco: Never   Vaping Use    Vaping Use: Never used   Substance and Sexual Activity    Alcohol use: Yes     Comment: Socially    Drug use: No     Social Determinants of Health     Financial Resource Strain: Low Risk  (7/14/2021)    Overall Financial Resource Strain (CARDIA)     Difficulty of Paying Living Expenses: Not hard at all   Food Insecurity: No Food Insecurity (7/14/2021)    Hunger Vital Sign     Worried About Running Out of Food in the Last Year: Never true     Ran Out of Food in the Last Year: Never true     Family History   Problem Relation Age of Onset    No Known Problems Mother     Other Father 85        dementia    High Blood Pressure Father     Other Brother 62        AAA    No Known Problems Brother     No Known Problems Son     No Known Problems Daughter      Current Outpatient Medications   Medication Sig Dispense Refill    levothyroxine (SYNTHROID) 50 MCG

## 2024-08-26 ENCOUNTER — OFFICE VISIT (OUTPATIENT)
Dept: UROLOGY | Age: 64
End: 2024-08-26
Payer: MEDICARE

## 2024-08-26 VITALS
BODY MASS INDEX: 28.79 KG/M2 | SYSTOLIC BLOOD PRESSURE: 126 MMHG | DIASTOLIC BLOOD PRESSURE: 82 MMHG | HEIGHT: 68 IN | HEART RATE: 68 BPM | WEIGHT: 190 LBS

## 2024-08-26 DIAGNOSIS — R97.20 ELEVATED PSA: Primary | ICD-10-CM

## 2024-08-26 PROCEDURE — 99213 OFFICE O/P EST LOW 20 MIN: CPT | Performed by: UROLOGY

## 2024-08-26 PROCEDURE — 1123F ACP DISCUSS/DSCN MKR DOCD: CPT | Performed by: UROLOGY

## 2024-08-26 ASSESSMENT — ENCOUNTER SYMPTOMS
ABDOMINAL DISTENTION: 0
ABDOMINAL PAIN: 0

## 2024-08-26 NOTE — PROGRESS NOTES
amLODIPine (NORVASC) 5 MG tablet       KLOR-CON 10 10 MEQ tablet   3     No current facility-administered medications for this visit.     Patient has no known allergies.  reviewed      Review of Systems   Constitutional:  Negative for unexpected weight change.   Gastrointestinal:  Negative for abdominal distention and abdominal pain.   Genitourinary:  Negative for difficulty urinating, flank pain and hematuria.         Objective:   Physical Exam  Genitourinary:     Prostate: Enlarged. Not tender and no nodules present.      Rectum: No external hemorrhoid.   Neurological:      Mental Status: He is alert.          PSA 8/9/24: 6.01 ng/ml  PSA 2/13/24: 4.83 ng/ml   PSA   Date Value Ref Range Status   08/15/2023 5.75 (H) 0.00 - 4.00 ng/mL Final   02/02/2023 5.27 (H) 0.00 - 4.00 ng/mL Final   04/07/2022 5.40 (H) 0.00 - 4.00 ng/mL Final   10/05/2021 5.54 (H) 0.00 - 4.00 ng/mL Final   03/04/2021 4.96 0.00 - 5.40 ng/mL Final     Comment:     When the Total PSA is between 3.00 and 10.00 ng/mL, consider  requesting a Free PSA to aid in diagnosis.         Assessment:      This is a 65 yo male with h/o HTN, BPH by exam without LUTs, and elevated PSA's s/p negative prostate biopsy on 9/17/18 and with a persistent PSA elevation that has increased but remains relatively stable. I again discussed the option of another prostate biopsy vs prostate MRI vs urine Select Mdx testing vs continued closer PSA observation and he wants the latter. He understands the approx 25 % risk for prostate cancer based on the current PSA and that without diagnosis and treatment there is risk for progression if cancer is present. He will consider a prostate MRI if the PSA continues to rise.       Plan:      F/U 6 mo for PSA        Roland Zayas MD

## 2025-01-13 DIAGNOSIS — R97.20 ELEVATED PSA: ICD-10-CM

## 2025-01-13 LAB — PSA SERPL-MCNC: 7.23 NG/ML (ref 0–4)

## 2025-01-16 ENCOUNTER — OFFICE VISIT (OUTPATIENT)
Dept: UROLOGY | Age: 65
End: 2025-01-16
Payer: MEDICARE

## 2025-01-16 VITALS
SYSTOLIC BLOOD PRESSURE: 132 MMHG | BODY MASS INDEX: 28.79 KG/M2 | WEIGHT: 190 LBS | HEART RATE: 74 BPM | HEIGHT: 68 IN | DIASTOLIC BLOOD PRESSURE: 76 MMHG

## 2025-01-16 DIAGNOSIS — R97.20 ELEVATED PSA: Primary | ICD-10-CM

## 2025-01-16 LAB
BILIRUBIN, POC: ABNORMAL
BLOOD URINE, POC: ABNORMAL
CLARITY, POC: CLEAR
COLOR, POC: YELLOW
GLUCOSE URINE, POC: ABNORMAL MG/DL
KETONES, POC: ABNORMAL MG/DL
LEUKOCYTE EST, POC: ABNORMAL
NITRITE, POC: ABNORMAL
PH, POC: 6
PROTEIN, POC: ABNORMAL MG/DL
SPECIFIC GRAVITY, POC: 1.02
UROBILINOGEN, POC: 0.2 MG/DL

## 2025-01-16 PROCEDURE — 81003 URINALYSIS AUTO W/O SCOPE: CPT | Performed by: UROLOGY

## 2025-01-16 PROCEDURE — 1160F RVW MEDS BY RX/DR IN RCRD: CPT | Performed by: UROLOGY

## 2025-01-16 PROCEDURE — 1123F ACP DISCUSS/DSCN MKR DOCD: CPT | Performed by: UROLOGY

## 2025-01-16 PROCEDURE — 99214 OFFICE O/P EST MOD 30 MIN: CPT | Performed by: UROLOGY

## 2025-01-16 PROCEDURE — 1159F MED LIST DOCD IN RCRD: CPT | Performed by: UROLOGY

## 2025-01-16 ASSESSMENT — ENCOUNTER SYMPTOMS
ABDOMINAL PAIN: 0
ABDOMINAL DISTENTION: 0

## 2025-01-16 NOTE — PROGRESS NOTES
R Negative R   Clarity, UA  Clear R   Clear R Clear R Clear R   Glucose, Ur  Negative R   Negative R Negative R Negative R   Bilirubin Urine  Negative R   Negative R Negative R Negative R   Ketones, Urine  Negative R   Negative R Negative R Negative R   Specific Gravity, UA  1.012 R   1.019 R 1.020 R 1.005 R   Blood, Urine  Negative R   Negative R Negative R TRACE Abnormal  R   pH, UA  7.0 R   5.0 R 6.0 R 7.5 R   Urobilinogen, Urine  0.2 R   0.2 R 0.2 R 0.2 R   Resulting Agency  [facility]   [facility]                    PSA   Date Value Ref Range Status   01/13/2025 7.23 (H) 0.00 - 4.00 ng/mL Final     Comment:     This Roche electrochemiluminescent immunoassay is performed  on the Julianne e immunoassay analyzer. Results obtained  with different test methods or kits must not be used  interchangeably.     08/15/2023 5.75 (H) 0.00 - 4.00 ng/mL Final   02/02/2023 5.27 (H) 0.00 - 4.00 ng/mL Final   04/07/2022 5.40 (H) 0.00 - 4.00 ng/mL Final   10/05/2021 5.54 (H) 0.00 - 4.00 ng/mL Final       Assessment:      This is a 64 yo male with h/o HTN, BPH by exam without LUTs, and elevated PSA's s/p negative prostate biopsy on 9/17/18 and with a persistent PSA elevation and recent unexplained rise. I again discussed the option of another prostate biopsy vs prostate MRI vs urine Select Mdx testing vs continued closer PSA observation and he wants and MRI and possible fusion biopsy if indicated. He understands the approx 25 % risk for prostate cancer based on the current PSA.       Plan:      Prostate MRI and F/U 1-2 weeks after to review        Roland Zayas MD

## 2025-01-27 ENCOUNTER — HOSPITAL ENCOUNTER (OUTPATIENT)
Dept: MRI IMAGING | Age: 65
Discharge: HOME | End: 2025-01-29
Attending: UROLOGY
Payer: MEDICARE

## 2025-01-27 VITALS
TEMPERATURE: 97.7 F | DIASTOLIC BLOOD PRESSURE: 78 MMHG | RESPIRATION RATE: 18 BRPM | OXYGEN SATURATION: 96 % | SYSTOLIC BLOOD PRESSURE: 157 MMHG | HEIGHT: 68 IN | WEIGHT: 190 LBS | BODY MASS INDEX: 28.79 KG/M2 | HEART RATE: 70 BPM

## 2025-01-27 PROCEDURE — 72197 MRI PELVIS W/O & W/DYE: CPT | Performed by: RADIOLOGY

## 2025-01-27 PROCEDURE — 2580000003 HC RX 258: Performed by: STUDENT IN AN ORGANIZED HEALTH CARE EDUCATION/TRAINING PROGRAM

## 2025-01-27 PROCEDURE — 72197 MRI PELVIS W/O & W/DYE: CPT

## 2025-01-27 RX ORDER — SODIUM CHLORIDE 9 MG/ML
INJECTION, SOLUTION INTRAVENOUS CONTINUOUS
Status: DISCONTINUED | OUTPATIENT
Start: 2025-01-27 | End: 2025-01-30 | Stop reason: HOSPADM

## 2025-01-27 RX ADMIN — SODIUM CHLORIDE: 9 INJECTION, SOLUTION INTRAVENOUS at 14:35

## 2025-01-27 ASSESSMENT — PAIN - FUNCTIONAL ASSESSMENT: PAIN_FUNCTIONAL_ASSESSMENT: 0-10

## 2025-02-06 ENCOUNTER — OFFICE VISIT (OUTPATIENT)
Dept: UROLOGY | Age: 65
End: 2025-02-06
Payer: MEDICARE

## 2025-02-06 VITALS
HEIGHT: 68 IN | DIASTOLIC BLOOD PRESSURE: 80 MMHG | SYSTOLIC BLOOD PRESSURE: 130 MMHG | WEIGHT: 190 LBS | BODY MASS INDEX: 28.79 KG/M2 | HEART RATE: 87 BPM

## 2025-02-06 DIAGNOSIS — R97.20 ELEVATED PSA: Primary | ICD-10-CM

## 2025-02-06 DIAGNOSIS — R93.89 ABNORMAL MRI: ICD-10-CM

## 2025-02-06 PROCEDURE — 1160F RVW MEDS BY RX/DR IN RCRD: CPT | Performed by: UROLOGY

## 2025-02-06 PROCEDURE — 1159F MED LIST DOCD IN RCRD: CPT | Performed by: UROLOGY

## 2025-02-06 PROCEDURE — 99214 OFFICE O/P EST MOD 30 MIN: CPT | Performed by: UROLOGY

## 2025-02-06 PROCEDURE — 1123F ACP DISCUSS/DSCN MKR DOCD: CPT | Performed by: UROLOGY

## 2025-02-06 RX ORDER — CIPROFLOXACIN 500 MG/1
500 TABLET, FILM COATED ORAL 2 TIMES DAILY
Qty: 4 TABLET | Refills: 0 | Status: SHIPPED | OUTPATIENT
Start: 2025-02-06

## 2025-02-06 ASSESSMENT — ENCOUNTER SYMPTOMS: ABDOMINAL PAIN: 0

## 2025-02-06 NOTE — PROGRESS NOTES
Subjective:      Patient ID: Jerad Maharaj is a 65 y.o. male    HPI  This is a 66 yo male with h/o HTN, and elevated PSA's s/p negative prostate biopsy on 9/17/18, back in follow-up. Since last seen on 1/16/25, he has no new voiding complaints. I reviewed the interval MRI done for elevated PSA. He had a recent PSA of 8 ng/ml done by his [facility] and the request is he have another biopsy given the rise in the PSA.      Prostate biopsy 9/18: neg, PV: 34.8 cc    Past Medical History:   Diagnosis Date    Hypertension     on meds x 5 yrs     Past Surgical History:   Procedure Laterality Date    COLONOSCOPY  4/20/16        LITHOTRIPSY  1990    [place] PROSTATE NEEDLE BIOPSY ANY APPROACH N/A 9/17/2018    TRANSRECTAL ULTRASOUND GUIDED PROSTATE BIOPSY performed by Roland [facility]as MD [facility]    VASECTOMY  12/1992     Social History     Socioeconomic History    Marital status:      Spouse name: None    Number of children: None    Years of education: None    Highest education level: None   Tobacco Use    Smoking status: Never    Smokeless tobacco: Never   Vaping Use    Vaping status: Never Used   Substance and Sexual Activity    Alcohol use: Yes     Comment: Socially    Drug use: No     Social Determinants of Health     Financial Resource Strain: Low Risk  (7/14/2021)    Overall Financial Resource Strain (CARDIA)     Difficulty of Paying Living Expenses: Not hard at all   Food Insecurity: No Food Insecurity (7/14/2021)    Hunger Vital Sign     Worried About Running Out of Food in the Last Year: Never true     Ran Out of Food in the Last Year: Never true     Family History   Problem Relation Age of Onset    No Known Problems Mother     Other Father 85        dementia    High Blood Pressure Father     Other Brother 62        AAA    No Known Problems Brother     No Known Problems Son     No Known Problems Daughter      Current Outpatient Medications   Medication Sig Dispense Refill    levothyroxine

## 2025-02-07 ENCOUNTER — OFFICE VISIT (OUTPATIENT)
Dept: INTERVENTIONAL RADIOLOGY/VASCULAR | Age: 65
End: 2025-02-07

## 2025-02-07 VITALS
HEIGHT: 68 IN | SYSTOLIC BLOOD PRESSURE: 128 MMHG | HEART RATE: 66 BPM | DIASTOLIC BLOOD PRESSURE: 82 MMHG | OXYGEN SATURATION: 100 % | BODY MASS INDEX: 28.79 KG/M2 | WEIGHT: 190 LBS | RESPIRATION RATE: 17 BRPM

## 2025-02-07 DIAGNOSIS — Z86.79 HISTORY OF HYPERTENSION: ICD-10-CM

## 2025-02-07 DIAGNOSIS — R97.20 ELEVATED PSA: Primary | ICD-10-CM

## 2025-02-07 NOTE — PROGRESS NOTES
VASCULAR MEDICINE AND INTERVENTIONAL RADIOLOGY DEPARTMENT:     Chief Complaint   Patient presents with    New Patient     Consult- prostate biopsy       HPI 2/7/25: Jerad Maharaj, a male of 65 y.o. came to the office 2/7/2025, referred by Dr. Zayas, for prostate biopsy.  Patient following with urology for history of elevated PSA.  Had MRI prostate revealing: Category 4 lesion in the left posterior peripheral zone, category 4 lesion in the left anterior transition zone, category 3 lesion in the right posterior transition zone with recommendation for biopsy.  Patient endorses history of prior prostate biopsy, biopsy was performed by Dr. Zayas on 9/17/2018 which was negative.  He denies family history of prostate cancer.  He denies blood or bleeding disorders.  He denies liver disorders.  He denies history of MI or stroke.  He denies lung disorders.  Does endorse history of hypertension.  Patient takes baby aspirin as a preventative medication.  He also takes 2 ibuprofen every night to help with leg pain while sleeping.  Patient takes a probiotic vitamin.  Patient denies smoking history.  Patient drinks 4 drinks a week.  Patient denies drug use.  Denies chest pain. Denies shortness of breath.    Medical history: Hypertension    Family History   Problem Relation Age of Onset    No Known Problems Mother     Other Father 85        dementia    High Blood Pressure Father     Other Brother 62        AAA    No Known Problems Brother     No Known Problems Son     No Known Problems Daughter        Past Surgical History:   Procedure Laterality Date    COLONOSCOPY  4/20/16        LITHOTRIPSY  1990    [place] PROSTATE NEEDLE BIOPSY ANY APPROACH N/A 9/17/2018    TRANSRECTAL ULTRASOUND GUIDED PROSTATE BIOPSY performed by Roland Zayas MD at [facility] [place]    VASECTOMY  12/1992        Past Medical History:   Diagnosis Date    Hypertension     on meds x 5 yrs       Social History     Socioeconomic History    Marital status:

## 2025-02-10 ENCOUNTER — TELEPHONE (OUTPATIENT)
Dept: INTERVENTIONAL RADIOLOGY/VASCULAR | Age: 65
End: 2025-02-10

## 2025-02-10 NOTE — TELEPHONE ENCOUNTER
Patient was given this information prior to leaving the office / via phone conversation - voiced understanding  2.  Spoke to [facility] in Specials to schedule procedure    >  Prostate biopsy is scheduled on 03/05/25 @ 1PM  >  You will need to arrive at 11AM (from home) and check in at the Diagnostic Imaging Check In desk.   >  Do not eat or drink after midnight.    >  Day before the procedure - eat a light breakfast and then only clear liquids the rest of the day   >  Patient / Nursing Home will make arrangements for transportation by -  >  Patient to hold ASPIRIN, IBUPROFEN, VITAMINS for 14 days prior to procedure - starting 2/19/2025  >  Patient to have PT/INR, CBC, and BMP drawn anytime between now and 1 day prior to procedure  >  Bowl Prep: Mix 8.3 ounces (238 g) of MiraLAX in 64 ounces of Gatorade.  The day before biopsy by 11:00 am, drink half of the mixture (32 ounces), then by 7:00 pm that evening, dring the remainder (32 ounces) of the mixture. The day of the biopsy, administer over-the-counter Fleets enema 2-3 hours prior to arrival to the hospital

## 2025-03-03 ENCOUNTER — TELEPHONE (OUTPATIENT)
Dept: INTERVENTIONAL RADIOLOGY/VASCULAR | Age: 65
End: 2025-03-03

## 2025-03-03 DIAGNOSIS — Z86.79 HISTORY OF HYPERTENSION: ICD-10-CM

## 2025-03-03 DIAGNOSIS — R97.20 ELEVATED PSA: ICD-10-CM

## 2025-03-03 LAB
ANION GAP SERPL CALCULATED.3IONS-SCNC: 13 MEQ/L (ref 9–15)
BUN SERPL-MCNC: 16 MG/DL (ref 8–23)
CALCIUM SERPL-MCNC: 9.5 MG/DL (ref 8.5–9.9)
CHLORIDE SERPL-SCNC: 101 MEQ/L (ref 95–107)
CO2 SERPL-SCNC: 30 MEQ/L (ref 20–31)
CREAT SERPL-MCNC: 1 MG/DL (ref 0.7–1.2)
ERYTHROCYTE [DISTWIDTH] IN BLOOD BY AUTOMATED COUNT: 13.1 % (ref 11.5–14.5)
GLUCOSE SERPL-MCNC: 115 MG/DL (ref 70–99)
HCT VFR BLD AUTO: 47.8 % (ref 42–52)
HGB BLD-MCNC: 16.6 G/DL (ref 14–18)
INR PPP: 0.9
MCH RBC QN AUTO: 30.9 PG (ref 27–31.3)
MCHC RBC AUTO-ENTMCNC: 34.7 % (ref 33–37)
MCV RBC AUTO: 88.8 FL (ref 79–92.2)
PLATELET # BLD AUTO: 256 K/UL (ref 130–400)
POTASSIUM SERPL-SCNC: 3.6 MEQ/L (ref 3.4–4.9)
PROTHROMBIN TIME: 12.8 SEC (ref 12.3–14.9)
RBC # BLD AUTO: 5.38 M/UL (ref 4.7–6.1)
SODIUM SERPL-SCNC: 144 MEQ/L (ref 135–144)
WBC # BLD AUTO: 7.2 K/UL (ref 4.8–10.8)

## 2025-03-03 NOTE — FLOWSHEET NOTE
1600 spoke with pts wife, aware of pre procedure instructions.  Pt having blood work drawn this afternoon.   Will arrive by 1030 to radiology desk on 3/5.  Questions answered, support given.

## 2025-03-03 NOTE — TELEPHONE ENCOUNTER
Pre procedure phone call placed. This patient is scheduled to have a prostate biopsy on 03/05/2025 by Dr. Roque. Voicemail received. I left a detailed message reviewing pre procedure instructions as given to him by Dr. Roque's office staff. Also reminded the patient that he needed to have blood work drawn prior to the procedure. Radiology phone number provided on message if the patient has any questions prior to scheduled procedure.

## 2025-03-05 ENCOUNTER — ANESTHESIA EVENT (OUTPATIENT)
Dept: INTERVENTIONAL RADIOLOGY/VASCULAR | Age: 65
End: 2025-03-05
Payer: MEDICARE

## 2025-03-05 ENCOUNTER — ANESTHESIA (OUTPATIENT)
Dept: INTERVENTIONAL RADIOLOGY/VASCULAR | Age: 65
End: 2025-03-05
Payer: MEDICARE

## 2025-03-05 ENCOUNTER — HOSPITAL ENCOUNTER (OUTPATIENT)
Dept: INTERVENTIONAL RADIOLOGY/VASCULAR | Age: 65
Discharge: HOME | End: 2025-03-07
Payer: MEDICARE

## 2025-03-05 VITALS
WEIGHT: 190 LBS | BODY MASS INDEX: 28.79 KG/M2 | OXYGEN SATURATION: 98 % | DIASTOLIC BLOOD PRESSURE: 82 MMHG | HEIGHT: 68 IN | SYSTOLIC BLOOD PRESSURE: 128 MMHG | RESPIRATION RATE: 8 BRPM | TEMPERATURE: 98.2 F | HEART RATE: 70 BPM

## 2025-03-05 DIAGNOSIS — R97.20 ELEVATED PSA: ICD-10-CM

## 2025-03-05 PROCEDURE — 7100000011 HC PHASE II RECOVERY - ADDTL 15 MIN

## 2025-03-05 PROCEDURE — 6360000002 HC RX W HCPCS: Performed by: RADIOLOGY

## 2025-03-05 PROCEDURE — 6360000002 HC RX W HCPCS: Performed by: NURSE PRACTITIONER

## 2025-03-05 PROCEDURE — 6370000000 HC RX 637 (ALT 250 FOR IP): Performed by: RADIOLOGY

## 2025-03-05 PROCEDURE — 2580000003 HC RX 258: Performed by: NURSE ANESTHETIST, CERTIFIED REGISTERED

## 2025-03-05 PROCEDURE — 2709999900 US BIOPSY PROSTATE NEEDLE/PUNCH

## 2025-03-05 PROCEDURE — 55706 BX PRST8 NDL SAT SAMPLING: CPT

## 2025-03-05 PROCEDURE — 6360000002 HC RX W HCPCS: Performed by: NURSE ANESTHETIST, CERTIFIED REGISTERED

## 2025-03-05 PROCEDURE — 7100000010 HC PHASE II RECOVERY - FIRST 15 MIN

## 2025-03-05 RX ORDER — SODIUM CHLORIDE 9 MG/ML
INJECTION, SOLUTION INTRAVENOUS PRN
OUTPATIENT
Start: 2025-03-05

## 2025-03-05 RX ORDER — MEPERIDINE HYDROCHLORIDE 25 MG/ML
12.5 INJECTION INTRAMUSCULAR; INTRAVENOUS; SUBCUTANEOUS
OUTPATIENT
Start: 2025-03-05 | End: 2025-03-06

## 2025-03-05 RX ORDER — FENTANYL CITRATE 50 UG/ML
INJECTION, SOLUTION INTRAMUSCULAR; INTRAVENOUS
Status: DISCONTINUED | OUTPATIENT
Start: 2025-03-05 | End: 2025-03-05 | Stop reason: SDUPTHER

## 2025-03-05 RX ORDER — NALOXONE HYDROCHLORIDE 0.4 MG/ML
INJECTION, SOLUTION INTRAMUSCULAR; INTRAVENOUS; SUBCUTANEOUS PRN
OUTPATIENT
Start: 2025-03-05

## 2025-03-05 RX ORDER — SODIUM CHLORIDE 0.9 % (FLUSH) 0.9 %
5-40 SYRINGE (ML) INJECTION PRN
OUTPATIENT
Start: 2025-03-05

## 2025-03-05 RX ORDER — NEOMYCIN/BACITRACIN/POLYMYXINB 3.5-400-5K
OINTMENT (GRAM) TOPICAL PRN
Status: COMPLETED | OUTPATIENT
Start: 2025-03-05 | End: 2025-03-05

## 2025-03-05 RX ORDER — ONDANSETRON 2 MG/ML
4 INJECTION INTRAMUSCULAR; INTRAVENOUS
OUTPATIENT
Start: 2025-03-05 | End: 2025-03-06

## 2025-03-05 RX ORDER — LEVOFLOXACIN 5 MG/ML
500 INJECTION, SOLUTION INTRAVENOUS ONCE
Status: COMPLETED | OUTPATIENT
Start: 2025-03-05 | End: 2025-03-05

## 2025-03-05 RX ORDER — OXYCODONE HYDROCHLORIDE 5 MG/1
5 TABLET ORAL
OUTPATIENT
Start: 2025-03-05 | End: 2025-03-06

## 2025-03-05 RX ORDER — FENTANYL CITRATE 0.05 MG/ML
50 INJECTION, SOLUTION INTRAMUSCULAR; INTRAVENOUS EVERY 10 MIN PRN
OUTPATIENT
Start: 2025-03-05

## 2025-03-05 RX ORDER — SODIUM CHLORIDE 0.9 % (FLUSH) 0.9 %
5-40 SYRINGE (ML) INJECTION EVERY 12 HOURS SCHEDULED
OUTPATIENT
Start: 2025-03-05

## 2025-03-05 RX ORDER — LIDOCAINE HYDROCHLORIDE 10 MG/ML
1 INJECTION, SOLUTION EPIDURAL; INFILTRATION; INTRACAUDAL; PERINEURAL
OUTPATIENT
Start: 2025-03-05 | End: 2025-03-06

## 2025-03-05 RX ORDER — LIDOCAINE HYDROCHLORIDE 20 MG/ML
INJECTION, SOLUTION INFILTRATION; PERINEURAL PRN
Status: COMPLETED | OUTPATIENT
Start: 2025-03-05 | End: 2025-03-05

## 2025-03-05 RX ORDER — PROPOFOL 10 MG/ML
INJECTION, EMULSION INTRAVENOUS
Status: DISCONTINUED | OUTPATIENT
Start: 2025-03-05 | End: 2025-03-05 | Stop reason: SDUPTHER

## 2025-03-05 RX ORDER — ONDANSETRON 2 MG/ML
INJECTION INTRAMUSCULAR; INTRAVENOUS
Status: DISCONTINUED | OUTPATIENT
Start: 2025-03-05 | End: 2025-03-05 | Stop reason: SDUPTHER

## 2025-03-05 RX ORDER — DIPHENHYDRAMINE HYDROCHLORIDE 50 MG/ML
12.5 INJECTION INTRAMUSCULAR; INTRAVENOUS
OUTPATIENT
Start: 2025-03-05 | End: 2025-03-06

## 2025-03-05 RX ORDER — LIDOCAINE HYDROCHLORIDE 10 MG/ML
INJECTION, SOLUTION EPIDURAL; INFILTRATION; INTRACAUDAL; PERINEURAL
Status: DISCONTINUED | OUTPATIENT
Start: 2025-03-05 | End: 2025-03-05 | Stop reason: SDUPTHER

## 2025-03-05 RX ORDER — METOCLOPRAMIDE HYDROCHLORIDE 5 MG/ML
10 INJECTION INTRAMUSCULAR; INTRAVENOUS
OUTPATIENT
Start: 2025-03-05 | End: 2025-03-06

## 2025-03-05 RX ORDER — SODIUM CHLORIDE 9 MG/ML
INJECTION, SOLUTION INTRAVENOUS
Status: DISCONTINUED | OUTPATIENT
Start: 2025-03-05 | End: 2025-03-05 | Stop reason: SDUPTHER

## 2025-03-05 RX ADMIN — PHENYLEPHRINE HYDROCHLORIDE 100 MCG: 10 INJECTION INTRAVENOUS at 11:40

## 2025-03-05 RX ADMIN — LEVOFLOXACIN 500 MG: 500 INJECTION, SOLUTION INTRAVENOUS at 10:58

## 2025-03-05 RX ADMIN — PHENYLEPHRINE HYDROCHLORIDE 100 MCG: 10 INJECTION INTRAVENOUS at 11:33

## 2025-03-05 RX ADMIN — FENTANYL CITRATE 50 MCG: 50 INJECTION, SOLUTION INTRAMUSCULAR; INTRAVENOUS at 11:09

## 2025-03-05 RX ADMIN — FENTANYL CITRATE 50 MCG: 50 INJECTION, SOLUTION INTRAMUSCULAR; INTRAVENOUS at 11:29

## 2025-03-05 RX ADMIN — ONDANSETRON 4 MG: 2 INJECTION, SOLUTION INTRAMUSCULAR; INTRAVENOUS at 11:09

## 2025-03-05 RX ADMIN — LIDOCAINE HYDROCHLORIDE 20 ML: 20 INJECTION, SOLUTION INFILTRATION; PERINEURAL at 11:32

## 2025-03-05 RX ADMIN — SODIUM CHLORIDE: 0.9 INJECTION, SOLUTION INTRAVENOUS at 11:09

## 2025-03-05 RX ADMIN — BACITRACIN, NEOMYCIN, POLYMYXIN B 1 EACH: 400; 3.5; 5 OINTMENT TOPICAL at 11:45

## 2025-03-05 RX ADMIN — LIDOCAINE HYDROCHLORIDE 25 MG: 10 INJECTION, SOLUTION EPIDURAL; INFILTRATION; INTRACAUDAL; PERINEURAL at 11:09

## 2025-03-05 RX ADMIN — PROPOFOL 200 MG: 10 INJECTION, EMULSION INTRAVENOUS at 11:09

## 2025-03-05 ASSESSMENT — PAIN - FUNCTIONAL ASSESSMENT: PAIN_FUNCTIONAL_ASSESSMENT: NONE - DENIES PAIN

## 2025-03-05 NOTE — ANESTHESIA PRE PROCEDURE
Department of Anesthesiology  Preprocedure Note       Name:  Jerad Maharaj   Age:  65 y.o.  :  1960                                          MRN:  [id number]         Date:  3/5/2025      Surgeon: Dr. Roque    Procedure: Prostate biopsy    Medications prior to admission:   Prior to Admission medications    Medication Sig Start Date End Date Taking? Authorizing Provider   ciprofloxacin (CIPRO) 500 MG tablet Take 1 tablet by mouth 2 times daily Start day before planned procedure and resume day after 25   Roland Zayas MD   levothyroxine (SYNTHROID) 50 MCG tablet  10/5/21   ProviderSalvador MD   fluticasone (FLONASE) 50 MCG/ACT nasal spray  21   ProviderSalvador MD   hydrochlorothiazide (HYDRODIURIL) 25 MG tablet  9/19/15   ProviderSalvador MD   amLODIPine (NORVASC) 5 MG tablet  9/19/15   Salvador Michel MD   KLOR-CON 10 10 MEQ tablet  2/15/16   ProviderSalvador MD       Current medications:    Current Outpatient Medications   Medication Sig Dispense Refill    ciprofloxacin (CIPRO) 500 MG tablet Take 1 tablet by mouth 2 times daily Start day before planned procedure and resume day after 4 tablet 0    levothyroxine (SYNTHROID) 50 MCG tablet       fluticasone (FLONASE) 50 MCG/ACT nasal spray       hydrochlorothiazide (HYDRODIURIL) 25 MG tablet       amLODIPine (NORVASC) 5 MG tablet       KLOR-CON 10 10 MEQ tablet   3     Current Facility-Administered Medications   Medication Dose Route Frequency Provider Last Rate Last Admin    levoFLOXacin (LEVAQUIN) 500 MG/100ML infusion 500 mg  500 mg IntraVENous Once Kimberly Allan APRN - CNP           Allergies:  No Known Allergies    Problem List:    Patient Active Problem List   Diagnosis Code    Elevated PSA R97.20       Past Medical History:        Diagnosis Date    Hypertension     on meds x 5 yrs       Past Surgical History:        Procedure Laterality Date    COLONOSCOPY  16        LITHOTRIPSY      [place]

## 2025-03-05 NOTE — DISCHARGE INSTRUCTIONS
HOME GOING INSTRUCTIONS FOLLOWING PROSTATE BIOPSY    1. ACTIVITY   Rest and avoid strenuous activity, such as bending or lifting heavy objects or straining with bowel movements for at least 48 hours.    Do not lift anything heavier than one gallon of milk x one week.  Avoid intercourse for at least one week.  Avoid baths, hot tubs, pools, and lakes for 2 weeks or longer if biopsy site not fully healed.   May shower after 24 hours and remove band aid. Pat biopsy site dry with clean towel.       2. DIET   Resume usual diet    3. MEDICATIONS   A. Resume home medications unless otherwise indicated by your physician.   B. May take non-aspirin pain reliever as needed.   C.  Continue antibiotic per physician's prescription.      Comments:    A small amount of pain after the biopsy should stay the same or begin to    lessen and should be easily controlled with mild medication.     4. DRAINAGE   Some blood in the urine, semen, or from the rectum is expected and may last for   three days after the biopsy.    5. IF YOU DEVELOP ANY OF THE FOLLOWING SYMPTOMS, CONTACT EITHER       YOUR FAMILY DOCTOR OR REPORT TO THE EMERGENCY ROOM FOR       FURTHER EVALUATION:   A.  Extreme pain   B. Active bleeding   C. Temperature of 100 degrees F and above   D. Pus in the urine    6. OTHER INSTRUCTIONS   If your doctor has not notified you in one week regarding the results of your   biopsy, please call that doctor's office.     Physician performing exam:  DR. HAND at [phone].  If you have questions of concerns tonight please call [facility] Radiology at [phone] and ask to speak to a RADIOLOGY RN.    If you are unable to contact the above physician and you feel you have a major problem, please go to the Emergency Room for treatment.

## 2025-03-05 NOTE — ANESTHESIA POSTPROCEDURE EVALUATION
Department of Anesthesiology  Postprocedure Note    Patient: Jerad Maharaj  MRN: [id number]  YOB: 1960  Date of evaluation: 3/5/2025    Procedure Summary       Date: 03/05/25 Room / Location: [facility] Vascular and Interventional Radiology; [facility] Special Procedure    Anesthesia Start: 1106 Anesthesia Stop: 1155    Procedure: US BIOPSY PROSTATE NEEDLE/PUNCH Diagnosis:       Elevated PSA      Elevated prostate specific antigen (PSA)    Scheduled Providers: Pradeep Roque MD Responsible Provider: Edin Ortega DO    Anesthesia Type: general ASA Status: 1            Anesthesia Type: No value filed.    Rowan Phase I: Rowan Score: 10    Rowan Phase II:      Anesthesia Post Evaluation    Patient location during evaluation: bedside  Patient participation: complete - patient participated  Level of consciousness: awake and awake and alert  Airway patency: patent  Nausea & Vomiting: no nausea and no vomiting  Cardiovascular status: hemodynamically stable  Respiratory status: acceptable  Hydration status: stable  Pain management: adequate        No notable events documented.

## 2025-03-05 NOTE — FLOWSHEET NOTE
Pt arrived back to ct holding. Pt  with 02 mask on 6L.  Pt sating 100%. Pt resting with eyes closed. Electronically signed by Marilee Mott RN on 3/5/2025 at 11:56 AM     Oral airway and 02 mask removed. Pt following commands. Pt opening eyes but remains drowsy. Electronically signed by Marilee Mott RN on 3/5/2025 at 11:59 AM     Pt closed his eyes and is resting at this time, vss. Electronically signed by Marilee Mott RN on 3/5/2025 at 12:02 PM      Pt alert and awake. Pt provided diet soda and cookies. Awaiting ordered lunch tray. VSS pt continues to deny pain.  Electronically signed by Marilee Mott RN on 3/5/2025 at 12:11 PM       Pts wife brought to beside. Electronically signed by Marilee Mott RN on 3/5/2025 at 12:16 PM     Pt provided lunch tray, wife remains at bedside. Pt also provided additional soda per request. Pt sitting up and eating without complication. Pt denies pain. Electronically signed by Marilee Mott RN on 3/5/2025 at 12:38 PM     VSS. Pt continues to tolerate well. Pt aware that he must urinate prior to Dc home. Electronically signed by Marilee Mott RN on 3/5/2025 at 1:00 PM     Pt attempting to use urinal but unable to urinate at this time.Pt provided additional water. Electronically signed by Marilee Mott RN on 3/5/2025 at 1:30 PM

## 2025-03-05 NOTE — OR NURSING
Transperineal magnetic resonance imaging and ultrasound guided prostate biopsy with monitored anesthesia care.  SEDATION per anesthesia    1106 - Pt brought to OR room 6 via cart from CT holding. Pt assisted onto procedural table and positioned supine.  Sedation and vitals monitoring done per anesthesia. Verbal and tactile support provided to patient.     1107 - Pre sedation timeout completed.  Sedation administration started per anesthesia.     Pt sedated. Pt placed into lithotomy position with legs in stirrups.    Pt's perineal area clipped of hair and then cleansed with chlorhexidine by CONOR, RN and myself.     1124 - Procedural timeout completed.    1127 - Dr. Roque gently inserted [employer] Transrectal probe to obtain ultrasound imaging of prostate, along with patient's MRI imaging downloaded onto [employer] machine, and locates prostate biopsy sites.  [facility] [employer] rep, present for procedure.       After 3 minute dry time, sterile draped applied to perineal area.    Perineal area numbed with Lidocaine 2% by Dr. Roque, see eMar.       gripNote Disposable Core Biopsy Instrument 18g x 20cm used to obtain core samples, see below.     Sample A:  Left anterior transition zone lesion core specimens x3 obtained at 1134 and placed into PREFER formalin.      Sample B: Right transition zone lesion core specimens x3 obtained at 1136 and placed into PREFER formalin.      Sample C: Left peripheral zone lesion core specimens x2 obtained at 1139 and placed into PREFER formalin.      Sample D: Right peripheral zone core specimens x4 obtained at 1140 and placed into PREFER formalin.      Sample E:  Left transition zone core specimens x2 obtained at 1142 and placed into PREFER formalin.      Sample F:  Left peripheral zone core specimens x1 obtained at 1142 and placed into PREFER formalin.      Total core samples obtained: 15    Total biopsy needles used: 1    Biopsy instrument removed. US rectal probe removed.

## 2025-03-05 NOTE — PROGRESS NOTES
Pt ambulated with steady gait to CT holding and changed into gown independently. Pt denies rash to perineal area. Pt states completing bowel preps of gatorade and miralax and fleets enema this am.     1034 Pt last ate yesterday morning at 0730 and last drank at 1900 last night. Pt's allergy list, medical history, and home medications list reviewed. Pt states he has not taken aspirin, ibuprofen, or vitamins for 14 days starting 2/19/24. Pt has been taking klor-con 10 rosa daily.    1036 Dr Ortega at cart-side explaining plan for anesthesia and assessing pt.     1040 Pt's VSS. Pt denies pain.     1051 IV started. Pt tolerated well.     1058 Dr Roque at cart-side explaining procedure, risks, answering questions.     1105 Pt taken via cart to the OR by Nilton MACEDO.

## 2025-03-05 NOTE — PROGRESS NOTES
Discharge instructions reviewed.     1348 Pt changed into street clothes independently.     1350 Pt taken via wheel chair with all belongings for discharge home with wife.

## 2025-03-06 ENCOUNTER — TELEPHONE (OUTPATIENT)
Dept: INTERVENTIONAL RADIOLOGY/VASCULAR | Age: 65
End: 2025-03-06

## 2025-03-06 NOTE — TELEPHONE ENCOUNTER
Post prostate biopsy follow up call made to patient. Pt reports he is doing well, no issues or concerns.

## 2025-03-25 ENCOUNTER — OFFICE VISIT (OUTPATIENT)
Dept: UROLOGY | Age: 65
End: 2025-03-25
Payer: MEDICARE

## 2025-03-25 VITALS
HEIGHT: 68 IN | BODY MASS INDEX: 28.79 KG/M2 | DIASTOLIC BLOOD PRESSURE: 84 MMHG | HEART RATE: 68 BPM | WEIGHT: 190 LBS | SYSTOLIC BLOOD PRESSURE: 130 MMHG

## 2025-03-25 DIAGNOSIS — R97.20 ELEVATED PSA: Primary | ICD-10-CM

## 2025-03-25 PROCEDURE — 1123F ACP DISCUSS/DSCN MKR DOCD: CPT | Performed by: UROLOGY

## 2025-03-25 PROCEDURE — 1159F MED LIST DOCD IN RCRD: CPT | Performed by: UROLOGY

## 2025-03-25 PROCEDURE — 99213 OFFICE O/P EST LOW 20 MIN: CPT | Performed by: UROLOGY

## 2025-03-25 ASSESSMENT — ENCOUNTER SYMPTOMS: ABDOMINAL PAIN: 0

## 2025-03-25 NOTE — PROGRESS NOTES
Subjective:      Patient ID: Jerad Maharaj is a 65 y.o. male    HPI  This is a 64 yo male with h/o HTN, and elevated PSA's s/p negative prostate biopsy on 9/17/18, back in follow-up with recent PSA and rise and now s/p MRI guided prostate fusion biopsy. He did well after the procedure. He has some mild hematuria that has been improving and no other complaints. I reviewed the interval pathology results.       Prostate biopsy 9/18: neg, PV: 34.8 cc    Past Medical History:   Diagnosis Date    Hypertension     on meds x 5 yrs     Past Surgical History:   Procedure Laterality Date    COLONOSCOPY  04/20/2016        LITHOTRIPSY  1990    MI PROSTATE NEEDLE BIOPSY ANY APPROACH N/A 09/17/2018    TRANSRECTAL ULTRASOUND GUIDED PROSTATE BIOPSY performed by Roland Zayas MD at [facility] OR    PROSTATE BIOPSY  03/05/2025    Prostate biopsy with Koelis completed by Dr. Roque - 15 core samples obtained for analysis     BIOPSY PROSTATE NEEDLE/PUNCH  3/5/2025     BIOPSY PROSTATE NEEDLE/PUNCH 3/5/2025 [facility] SPECIAL PROCEDURE    VASECTOMY  12/1992     Social History     Socioeconomic History    Marital status:      Spouse name: None    Number of children: None    Years of education: None    Highest education level: None   Tobacco Use    Smoking status: Never    Smokeless tobacco: Never   Vaping Use    Vaping status: Never Used   Substance and Sexual Activity    Alcohol use: Yes     Comment: Socially    Drug use: No     Social Drivers of Health     Financial Resource Strain: Low Risk  (7/14/2021)    Overall Financial Resource Strain (CARDIA)     Difficulty of Paying Living Expenses: Not hard at all   Food Insecurity: No Food Insecurity (7/14/2021)    Hunger Vital Sign     Worried About Running Out of Food in the Last Year: Never true     Ran Out of Food in the Last Year: Never true     Family History   Problem Relation Age of Onset    No Known Problems Mother     Other Father 85        dementia    High Blood

## (undated) DEVICE — SYRINGE MED 10ML LUERLOCK TIP W/O SFTY DISP

## (undated) DEVICE — TOWEL,OR,DSP,ST,BLUE,STD,4/PK,20PK/CS: Brand: MEDLINE

## (undated) DEVICE — TRAY PREP DRY W/ PREM GLV 2 APPL 6 SPNG 2 UNDPD 1 OVERWRAP

## (undated) DEVICE — NEEDLE SPNL 22GA L5IN BLK HUB S STL W/ QNCKE PNT W/OUT

## (undated) DEVICE — MAX-CORE® DISPOSABLE CORE BIOPSY INSTRUMENT, 18G X 20CM: Brand: MAX-CORE

## (undated) DEVICE — GAUZE,SPONGE,4"X4",16PLY,XRAY,STRL,LF: Brand: MEDLINE

## (undated) DEVICE — GLOVE SURG SZ 75 STD WHT LTX SYN POLYMER BEAD REINF ANTI RL

## (undated) DEVICE — DEVICE BX 18 GAX21 CM EZ COR

## (undated) DEVICE — Z CONVERTED USE 2271043 CONTAINER SPEC COLL 4OZ SCR ON LID PEEL PCH

## (undated) DEVICE — DBD-PACK,LITHOTOMY,PK II,AURORA: Brand: MEDLINE

## (undated) DEVICE — PAD N ADH W3XL4IN POLY COT SFT PERF FLM EASILY CUT ABSRB

## (undated) DEVICE — LUBRICANT SURG JELLY ST BACTER TUBE 4.25OZ

## (undated) DEVICE — GOWN,AURORA,NONREINFORCED,LARGE: Brand: MEDLINE